# Patient Record
Sex: FEMALE | Race: BLACK OR AFRICAN AMERICAN | NOT HISPANIC OR LATINO | Employment: PART TIME
[De-identification: names, ages, dates, MRNs, and addresses within clinical notes are randomized per-mention and may not be internally consistent; named-entity substitution may affect disease eponyms.]

---

## 2018-04-17 ENCOUNTER — RECORDS - HEALTHEAST (OUTPATIENT)
Dept: ADMINISTRATIVE | Facility: OTHER | Age: 33
End: 2018-04-17

## 2018-04-17 ENCOUNTER — RECORDS - HEALTHEAST (OUTPATIENT)
Dept: LAB | Facility: CLINIC | Age: 33
End: 2018-04-17

## 2018-04-17 LAB
ALBUMIN SERPL-MCNC: 3.3 G/DL (ref 3.5–5)
ALP SERPL-CCNC: 64 U/L (ref 45–120)
ALT SERPL W P-5'-P-CCNC: 22 U/L (ref 0–45)
ANION GAP SERPL CALCULATED.3IONS-SCNC: 10 MMOL/L (ref 5–18)
AST SERPL W P-5'-P-CCNC: 24 U/L (ref 0–40)
BILIRUB SERPL-MCNC: 0.3 MG/DL (ref 0–1)
BUN SERPL-MCNC: 11 MG/DL (ref 8–22)
CALCIUM SERPL-MCNC: 9.4 MG/DL (ref 8.5–10.5)
CHLORIDE BLD-SCNC: 108 MMOL/L (ref 98–107)
CHOLEST SERPL-MCNC: 165 MG/DL
CO2 SERPL-SCNC: 21 MMOL/L (ref 22–31)
CREAT SERPL-MCNC: 0.79 MG/DL (ref 0.6–1.1)
FASTING STATUS PATIENT QL REPORTED: NORMAL
GFR SERPL CREATININE-BSD FRML MDRD: >60 ML/MIN/1.73M2
GLUCOSE BLD-MCNC: 120 MG/DL (ref 70–125)
HDLC SERPL-MCNC: 53 MG/DL
LDLC SERPL CALC-MCNC: 91 MG/DL
MAGNESIUM SERPL-MCNC: 1.7 MG/DL (ref 1.8–2.6)
POTASSIUM BLD-SCNC: 3.9 MMOL/L (ref 3.5–5)
PROT SERPL-MCNC: 5.9 G/DL (ref 6–8)
SODIUM SERPL-SCNC: 139 MMOL/L (ref 136–145)
TRIGL SERPL-MCNC: 105 MG/DL

## 2018-04-18 LAB
HPV SOURCE: NORMAL
HUMAN PAPILLOMA VIRUS 16 DNA: NEGATIVE
HUMAN PAPILLOMA VIRUS 18 DNA: NEGATIVE
HUMAN PAPILLOMA VIRUS FINAL DIAGNOSIS: NORMAL
HUMAN PAPILLOMA VIRUS OTHER HR: NEGATIVE
SPECIMEN DESCRIPTION: NORMAL

## 2018-04-24 ENCOUNTER — HOSPITAL ENCOUNTER (OUTPATIENT)
Dept: MAMMOGRAPHY | Facility: CLINIC | Age: 33
Discharge: HOME | End: 2018-04-24

## 2018-04-24 DIAGNOSIS — R59.0 ENLARGED LYMPH NODES IN ARMPIT: ICD-10-CM

## 2018-06-15 ENCOUNTER — RECORDS - HEALTHEAST (OUTPATIENT)
Dept: LAB | Facility: CLINIC | Age: 33
End: 2018-06-15

## 2018-06-15 LAB
T4 FREE SERPL-MCNC: 1 NG/DL (ref 0.7–1.8)
TSH SERPL DL<=0.005 MIU/L-ACNC: 0.76 UIU/ML (ref 0.3–5)

## 2018-10-17 ENCOUNTER — RECORDS - HEALTHEAST (OUTPATIENT)
Dept: LAB | Facility: CLINIC | Age: 33
End: 2018-10-17

## 2018-10-18 LAB — HCG SERPL-ACNC: ABNORMAL MLU/ML (ref 0–4)

## 2018-10-30 ENCOUNTER — COMMUNICATION - HEALTHEAST (OUTPATIENT)
Dept: SCHEDULING | Facility: CLINIC | Age: 33
End: 2018-10-30

## 2018-11-14 ENCOUNTER — AMBULATORY - HEALTHEAST (OUTPATIENT)
Dept: MATERNAL FETAL MEDICINE | Facility: HOSPITAL | Age: 33
End: 2018-11-14

## 2018-11-14 ENCOUNTER — TELEPHONE (OUTPATIENT)
Dept: MATERNAL FETAL MEDICINE | Facility: CLINIC | Age: 33
End: 2018-11-14

## 2018-11-14 ENCOUNTER — CARE COORDINATION (OUTPATIENT)
Dept: MATERNAL FETAL MEDICINE | Facility: CLINIC | Age: 33
End: 2018-11-14

## 2018-11-14 DIAGNOSIS — Z34.90 PRENATAL CARE, ANTEPARTUM: ICD-10-CM

## 2018-11-15 ENCOUNTER — TRANSFERRED RECORDS (OUTPATIENT)
Dept: HEALTH INFORMATION MANAGEMENT | Facility: CLINIC | Age: 33
End: 2018-11-15

## 2018-11-15 ENCOUNTER — MEDICAL CORRESPONDENCE (OUTPATIENT)
Dept: HEALTH INFORMATION MANAGEMENT | Facility: CLINIC | Age: 33
End: 2018-11-15

## 2018-11-15 ENCOUNTER — AMBULATORY - HEALTHEAST (OUTPATIENT)
Dept: MATERNAL FETAL MEDICINE | Facility: HOSPITAL | Age: 33
End: 2018-11-15

## 2018-11-15 DIAGNOSIS — Z34.90 PRENATAL CARE, ANTEPARTUM: ICD-10-CM

## 2018-11-15 DIAGNOSIS — Z98.890 HISTORY OF CERCLAGE, CURRENTLY PREGNANT: ICD-10-CM

## 2018-11-15 DIAGNOSIS — O09.299 HISTORY OF CERCLAGE, CURRENTLY PREGNANT: ICD-10-CM

## 2018-11-15 DIAGNOSIS — O09.899 HISTORY OF PRETERM DELIVERY, CURRENTLY PREGNANT: Primary | ICD-10-CM

## 2018-11-16 ENCOUNTER — AMBULATORY - HEALTHEAST (OUTPATIENT)
Dept: MATERNAL FETAL MEDICINE | Facility: HOSPITAL | Age: 33
End: 2018-11-16

## 2018-11-28 ENCOUNTER — OFFICE VISIT - HEALTHEAST (OUTPATIENT)
Dept: MATERNAL FETAL MEDICINE | Facility: HOSPITAL | Age: 33
End: 2018-11-28

## 2018-11-28 ENCOUNTER — RECORDS - HEALTHEAST (OUTPATIENT)
Dept: ULTRASOUND IMAGING | Facility: HOSPITAL | Age: 33
End: 2018-11-28

## 2018-11-28 ENCOUNTER — RECORDS - HEALTHEAST (OUTPATIENT)
Dept: ADMINISTRATIVE | Facility: OTHER | Age: 33
End: 2018-11-28

## 2018-11-28 DIAGNOSIS — Z34.90 ENCOUNTER FOR SUPERVISION OF NORMAL PREGNANCY, UNSPECIFIED, UNSPECIFIED TRIMESTER: ICD-10-CM

## 2018-11-28 DIAGNOSIS — O09.892 HISTORY OF PRETERM DELIVERY, CURRENTLY PREGNANT IN SECOND TRIMESTER: ICD-10-CM

## 2018-11-28 DIAGNOSIS — Z34.90 PRENATAL CARE, ANTEPARTUM: ICD-10-CM

## 2018-11-28 DIAGNOSIS — O09.522 AMA (ADVANCED MATERNAL AGE) MULTIGRAVIDA 35+, SECOND TRIMESTER: ICD-10-CM

## 2018-11-28 DIAGNOSIS — O99.210 OBESITY AFFECTING PREGNANCY, ANTEPARTUM: ICD-10-CM

## 2018-12-23 NOTE — TELEPHONE ENCOUNTER
Called and spoke with Lory regarding the referral sent by her primary OB provider for an MFM consultation.  Asked Lory about her previous pregnancy experiences and outcomes and where she received care during these labors.  Lory informed writer she had two previous  labors and deliveries, one of which occurred after cerclage placement. Lory informed this writer care was received at [facility].  Release of information sent to email provided.  Will await signed ANDREE and then schedule patient as soon as possible.    Shira Little RN   no

## 2018-12-26 ENCOUNTER — RECORDS - HEALTHEAST (OUTPATIENT)
Dept: ADMINISTRATIVE | Facility: OTHER | Age: 33
End: 2018-12-26

## 2019-01-14 ENCOUNTER — AMBULATORY - HEALTHEAST (OUTPATIENT)
Dept: MATERNAL FETAL MEDICINE | Facility: HOSPITAL | Age: 34
End: 2019-01-14

## 2019-02-25 ENCOUNTER — AMBULATORY - HEALTHEAST (OUTPATIENT)
Dept: MATERNAL FETAL MEDICINE | Facility: HOSPITAL | Age: 34
End: 2019-02-25

## 2019-02-25 DIAGNOSIS — O26.90 PREGNANCY, ANTEPARTUM, COMPLICATIONS: ICD-10-CM

## 2019-02-26 ENCOUNTER — AMBULATORY - HEALTHEAST (OUTPATIENT)
Dept: MATERNAL FETAL MEDICINE | Facility: HOSPITAL | Age: 34
End: 2019-02-26

## 2019-02-26 DIAGNOSIS — O26.90 PREGNANCY, ANTEPARTUM, COMPLICATIONS: ICD-10-CM

## 2019-02-26 DIAGNOSIS — O35.9XX0 SUSPECTED FETAL ANOMALY, ANTEPARTUM, SINGLE OR UNSPECIFIED FETUS: Primary | ICD-10-CM

## 2019-02-27 ENCOUNTER — AMBULATORY - HEALTHEAST (OUTPATIENT)
Dept: MATERNAL FETAL MEDICINE | Facility: HOSPITAL | Age: 34
End: 2019-02-27

## 2019-02-27 DIAGNOSIS — O26.90 PREGNANCY, ANTEPARTUM, COMPLICATIONS: ICD-10-CM

## 2019-02-27 DIAGNOSIS — Z33.1 PREGNANCY, INCIDENTAL: Primary | ICD-10-CM

## 2019-02-28 ENCOUNTER — TELEPHONE (OUTPATIENT)
Dept: MATERNAL FETAL MEDICINE | Facility: CLINIC | Age: 34
End: 2019-02-28

## 2019-02-28 NOTE — TELEPHONE ENCOUNTER
CARMEN received referral from [facility].  Patient is scheduled for L2 and RAD consult on 3/8/19 at [facility].    Rose Marie Crain

## 2019-03-08 ENCOUNTER — RECORDS - HEALTHEAST (OUTPATIENT)
Dept: ADMINISTRATIVE | Facility: OTHER | Age: 34
End: 2019-03-08

## 2019-03-08 ENCOUNTER — RECORDS - HEALTHEAST (OUTPATIENT)
Dept: ULTRASOUND IMAGING | Facility: HOSPITAL | Age: 34
End: 2019-03-08

## 2019-03-08 ENCOUNTER — OFFICE VISIT - HEALTHEAST (OUTPATIENT)
Dept: MATERNAL FETAL MEDICINE | Facility: HOSPITAL | Age: 34
End: 2019-03-08

## 2019-03-08 DIAGNOSIS — O26.90 PREGNANCY RELATED CONDITIONS, UNSPECIFIED, UNSPECIFIED TRIMESTER: ICD-10-CM

## 2019-03-08 DIAGNOSIS — O35.9XX0 SUSPECTED FETAL ANOMALY, ANTEPARTUM, SINGLE OR UNSPECIFIED FETUS: ICD-10-CM

## 2019-03-26 ENCOUNTER — RECORDS - HEALTHEAST (OUTPATIENT)
Dept: ADMINISTRATIVE | Facility: OTHER | Age: 34
End: 2019-03-26

## 2019-04-08 ENCOUNTER — AMBULATORY - HEALTHEAST (OUTPATIENT)
Dept: MATERNAL FETAL MEDICINE | Facility: HOSPITAL | Age: 34
End: 2019-04-08

## 2019-04-08 DIAGNOSIS — O26.90 PREGNANCY, ANTEPARTUM, COMPLICATIONS: ICD-10-CM

## 2019-04-10 ENCOUNTER — RECORDS - HEALTHEAST (OUTPATIENT)
Dept: ULTRASOUND IMAGING | Facility: HOSPITAL | Age: 34
End: 2019-04-10

## 2019-04-10 ENCOUNTER — RECORDS - HEALTHEAST (OUTPATIENT)
Dept: ADMINISTRATIVE | Facility: OTHER | Age: 34
End: 2019-04-10

## 2019-04-10 ENCOUNTER — OFFICE VISIT - HEALTHEAST (OUTPATIENT)
Dept: MATERNAL FETAL MEDICINE | Facility: HOSPITAL | Age: 34
End: 2019-04-10

## 2019-04-10 DIAGNOSIS — O26.90 PREGNANCY RELATED CONDITIONS, UNSPECIFIED, UNSPECIFIED TRIMESTER: ICD-10-CM

## 2019-04-10 DIAGNOSIS — Z03.74 SUSPECTED PROBLEM WITH FETAL GROWTH NOT FOUND: ICD-10-CM

## 2019-05-06 ENCOUNTER — RECORDS - HEALTHEAST (OUTPATIENT)
Dept: ADMINISTRATIVE | Facility: OTHER | Age: 34
End: 2019-05-06

## 2019-05-06 ASSESSMENT — MIFFLIN-ST. JEOR: SCORE: 2037.13

## 2019-05-07 ENCOUNTER — ANESTHESIA - HEALTHEAST (OUTPATIENT)
Dept: OBGYN | Facility: HOSPITAL | Age: 34
End: 2019-05-07

## 2019-05-08 ENCOUNTER — SURGERY - HEALTHEAST (OUTPATIENT)
Dept: OBGYN | Facility: HOSPITAL | Age: 34
End: 2019-05-08

## 2019-05-15 ENCOUNTER — COMMUNICATION - HEALTHEAST (OUTPATIENT)
Dept: OBGYN | Facility: HOSPITAL | Age: 34
End: 2019-05-15

## 2019-05-20 ENCOUNTER — COMMUNICATION - HEALTHEAST (OUTPATIENT)
Dept: OBGYN | Facility: HOSPITAL | Age: 34
End: 2019-05-20

## 2019-10-16 ENCOUNTER — RECORDS - HEALTHEAST (OUTPATIENT)
Dept: LAB | Facility: CLINIC | Age: 34
End: 2019-10-16

## 2019-10-16 LAB
ALBUMIN SERPL-MCNC: 3.4 G/DL (ref 3.5–5)
ALP SERPL-CCNC: 50 U/L (ref 45–120)
ALT SERPL W P-5'-P-CCNC: 21 U/L (ref 0–45)
ANION GAP SERPL CALCULATED.3IONS-SCNC: 8 MMOL/L (ref 5–18)
AST SERPL W P-5'-P-CCNC: 22 U/L (ref 0–40)
BILIRUB SERPL-MCNC: 0.2 MG/DL (ref 0–1)
BUN SERPL-MCNC: 12 MG/DL (ref 8–22)
CALCIUM SERPL-MCNC: 9.4 MG/DL (ref 8.5–10.5)
CHLORIDE BLD-SCNC: 108 MMOL/L (ref 98–107)
CHOLEST SERPL-MCNC: 159 MG/DL
CO2 SERPL-SCNC: 23 MMOL/L (ref 22–31)
CREAT SERPL-MCNC: 0.87 MG/DL (ref 0.6–1.1)
FASTING STATUS PATIENT QL REPORTED: NORMAL
GFR SERPL CREATININE-BSD FRML MDRD: >60 ML/MIN/1.73M2
GLUCOSE BLD-MCNC: 109 MG/DL (ref 70–125)
HDLC SERPL-MCNC: 52 MG/DL
LDLC SERPL CALC-MCNC: 86 MG/DL
MAGNESIUM SERPL-MCNC: 1.8 MG/DL (ref 1.8–2.6)
POTASSIUM BLD-SCNC: 4.6 MMOL/L (ref 3.5–5)
PROT SERPL-MCNC: 5.9 G/DL (ref 6–8)
SODIUM SERPL-SCNC: 139 MMOL/L (ref 136–145)
TRIGL SERPL-MCNC: 107 MG/DL

## 2020-06-24 ENCOUNTER — RECORDS - HEALTHEAST (OUTPATIENT)
Dept: LAB | Facility: CLINIC | Age: 35
End: 2020-06-24

## 2020-06-24 LAB
C DIFF TOX B STL QL: NEGATIVE
RIBOTYPE 027/NAP1/BI: NORMAL

## 2020-06-25 LAB
G LAMBLIA AG STL QL IA: NORMAL
O+P STL MICRO: NORMAL
SHIGA TOXIN 1: NEGATIVE
SHIGA TOXIN 2: NEGATIVE

## 2020-06-27 LAB — BACTERIA SPEC CULT: NORMAL

## 2020-07-21 ENCOUNTER — RECORDS - HEALTHEAST (OUTPATIENT)
Dept: LAB | Facility: CLINIC | Age: 35
End: 2020-07-21

## 2020-07-21 LAB
ALBUMIN SERPL-MCNC: 3.1 G/DL (ref 3.5–5)
ALP SERPL-CCNC: 59 U/L (ref 45–120)
ALT SERPL W P-5'-P-CCNC: 21 U/L (ref 0–45)
ANION GAP SERPL CALCULATED.3IONS-SCNC: 6 MMOL/L (ref 5–18)
AST SERPL W P-5'-P-CCNC: 18 U/L (ref 0–40)
BILIRUB SERPL-MCNC: <0.1 MG/DL (ref 0–1)
BUN SERPL-MCNC: 9 MG/DL (ref 8–22)
CALCIUM SERPL-MCNC: 9.1 MG/DL (ref 8.5–10.5)
CHLORIDE BLD-SCNC: 108 MMOL/L (ref 98–107)
CO2 SERPL-SCNC: 23 MMOL/L (ref 22–31)
CREAT SERPL-MCNC: 0.8 MG/DL (ref 0.6–1.1)
GFR SERPL CREATININE-BSD FRML MDRD: >60 ML/MIN/1.73M2
GLUCOSE BLD-MCNC: 99 MG/DL (ref 70–125)
IRON SATN MFR SERPL: 3 % (ref 20–50)
IRON SERPL-MCNC: 12 UG/DL (ref 42–175)
MAGNESIUM SERPL-MCNC: 2 MG/DL (ref 1.8–2.6)
POTASSIUM BLD-SCNC: 4.5 MMOL/L (ref 3.5–5)
PROT SERPL-MCNC: 6 G/DL (ref 6–8)
SODIUM SERPL-SCNC: 137 MMOL/L (ref 136–145)
TIBC SERPL-MCNC: 414 UG/DL (ref 313–563)
TRANSFERRIN SERPL-MCNC: 331 MG/DL (ref 212–360)
TSH SERPL DL<=0.005 MIU/L-ACNC: 0.66 UIU/ML (ref 0.3–5)
VIT B12 SERPL-MCNC: 370 PG/ML (ref 213–816)

## 2020-07-23 ENCOUNTER — RECORDS - HEALTHEAST (OUTPATIENT)
Dept: LAB | Facility: CLINIC | Age: 35
End: 2020-07-23

## 2020-07-28 LAB
GLIADIN IGA SER-ACNC: 0.3 U/ML
GLIADIN IGG SER-ACNC: <0.4 U/ML
IGA SERPL-MCNC: 79 MG/DL (ref 65–400)
TTG IGA SER-ACNC: 0.2 U/ML
TTG IGG SER-ACNC: <0.6 U/ML

## 2020-08-11 ENCOUNTER — RECORDS - HEALTHEAST (OUTPATIENT)
Dept: LAB | Facility: CLINIC | Age: 35
End: 2020-08-11

## 2020-08-11 ENCOUNTER — TRANSFERRED RECORDS (OUTPATIENT)
Dept: HEALTH INFORMATION MANAGEMENT | Facility: CLINIC | Age: 35
End: 2020-08-11

## 2020-08-11 LAB
ERYTHROCYTE [DISTWIDTH] IN BLOOD BY AUTOMATED COUNT: 17.4 % (ref 11–14.5)
HCT VFR BLD AUTO: 32.7 % (ref 35–47)
HGB BLD-MCNC: 9.6 G/DL (ref 12–16)
MCH RBC QN AUTO: 20.1 PG (ref 27–34)
MCHC RBC AUTO-ENTMCNC: 29.4 G/DL (ref 32–36)
MCV RBC AUTO: 69 FL (ref 80–100)
PLATELET # BLD AUTO: 509 THOU/UL (ref 140–440)
PMV BLD AUTO: 10.8 FL (ref 8.5–12.5)
RBC # BLD AUTO: 4.77 MILL/UL (ref 3.8–5.4)
WBC: 6.1 THOU/UL (ref 4–11)

## 2020-08-25 ENCOUNTER — OFFICE VISIT (OUTPATIENT)
Dept: NEUROLOGY | Facility: CLINIC | Age: 35
End: 2020-08-25
Payer: COMMERCIAL

## 2020-08-25 ENCOUNTER — RECORDS - HEALTHEAST (OUTPATIENT)
Dept: ADMINISTRATIVE | Facility: OTHER | Age: 35
End: 2020-08-25

## 2020-08-25 VITALS
SYSTOLIC BLOOD PRESSURE: 124 MMHG | DIASTOLIC BLOOD PRESSURE: 82 MMHG | BODY MASS INDEX: 42.03 KG/M2 | HEART RATE: 94 BPM | WEIGHT: 267.8 LBS | HEIGHT: 67 IN

## 2020-08-25 DIAGNOSIS — R20.2 PARESTHESIAS: Primary | ICD-10-CM

## 2020-08-25 DIAGNOSIS — E66.01 MORBID OBESITY (H): ICD-10-CM

## 2020-08-25 PROBLEM — I10 BENIGN ESSENTIAL HYPERTENSION: Status: ACTIVE | Noted: 2020-08-25

## 2020-08-25 PROBLEM — O09.40 GESTATIONAL HYPERTENSION AFFECTING EIGHTH PREGNANCY: Status: ACTIVE | Noted: 2019-05-07

## 2020-08-25 PROBLEM — K50.90 CROHN'S DISEASE (H): Status: ACTIVE | Noted: 2020-08-25

## 2020-08-25 PROBLEM — O13.9 GESTATIONAL HYPERTENSION AFFECTING EIGHTH PREGNANCY: Status: ACTIVE | Noted: 2019-05-07

## 2020-08-25 PROCEDURE — 99205 OFFICE O/P NEW HI 60 MIN: CPT | Performed by: PSYCHIATRY & NEUROLOGY

## 2020-08-25 RX ORDER — NIFEDIPINE 30 MG/1
30 TABLET, EXTENDED RELEASE ORAL DAILY
COMMUNITY
Start: 2019-05-14

## 2020-08-25 RX ORDER — IBUPROFEN 400 MG/1
400 TABLET, FILM COATED ORAL PRN
COMMUNITY
Start: 2020-08-04

## 2020-08-25 RX ORDER — DESOGESTREL AND ETHINYL ESTRADIOL AND ETHINYL ESTRADIOL 21-5 (28)
KIT ORAL
COMMUNITY
Start: 2020-07-13

## 2020-08-25 RX ORDER — BUDESONIDE 3 MG/1
3 CAPSULE, COATED PELLETS ORAL DAILY
COMMUNITY
Start: 2020-08-03

## 2020-08-25 RX ORDER — ACETAMINOPHEN 500 MG
1000 TABLET ORAL
COMMUNITY

## 2020-08-25 RX ORDER — FERROUS FUMARATE 324(106)MG
324 TABLET ORAL DAILY
COMMUNITY
Start: 2020-08-12

## 2020-08-25 ASSESSMENT — MIFFLIN-ST. JEOR: SCORE: 1932.36

## 2020-08-25 NOTE — LETTER
2020         RE: Lory Rodriguez  [address]  [place] [address]        Dear Colleague,    Thank you for referring your patient, Lory Rodriguez, to the [facility] NEUROLOGY [place]. Please see a copy of my visit note below.    NEUROLOGY CONSULTATION NOTE       [facility] NEUROLOGY [place]  [address] [place] [address]  Tel: [phone]  Fax: [phone]  [email or web address]     Lory Rodriguez,  1985, MRN [id number]  PCP: Zahra Valdez, [phone]  Date: 2020     ASSESSMENT & PLAN     Diagnosis code: Paresthesias     Right arm and leg cramps associated with left leg burning/tingling  35-year-old female with hypertension and possible Crohn's disease who has somewhat peculiar set of symptoms.  She reports that change in position brings on right arm and leg cramping along with left leg burning.  These symptoms are severe enough that she avoids sitting at work.  She had MRI of the lumbar spine that was unremarkable.  I have told her that we will start of looking at her spine and check MRI of cervical and thoracic spine in addition to EMG of the lower extremity.  In addition, I am checking B12, vitamin E, folic acid and RPR.  If these tests are negative, next step will be to check MRI of the head and EEG.  I do not get the sense that these are psychosomatic symptoms and hopefully above work-up will help clarify clinical picture.    Thank you again for this referral, please feel free to contact me if you have any questions.    Andrew Hernandez MD  [facility]  (Formerly, [facility]     REASON FOR CONSULTATION Neurologic Problem        HISTORY OF PRESENT ILLNESS     We have been requested by Dr. valdez to evaluate Lory Rodriguez who is a 35 year old  female for paresthesias and muscle spasm    Patient is morbidly obese 35 years history of hypertension, Crohn's disease who  was referred for evaluation of right leg and cramping associated with left leg burning that started 2 months ago.  She has somewhat peculiar description of her symptoms and reports that if she has been sitting for long period of time or laying down and as she tries to get up she notices that her right arm and leg would cramp up and at that time her left leg starts burning.  She had MRI of the lumbar spine that was unremarkable.  The symptoms are severe enough that she avoids sitting at work.  She denies any neck or back pain.  There is no history of any bowel or bladder incontinence or any tonic-clonic activity.  There is no history of any nutritional deficiencies or any trauma.  She also carries a diagnosis of Crohn's disease and currently is getting some work-up done.  She denies any skin color changes, edema, fasciculations, dysphagia, dysarthria or diplopia     PROBLEM LIST   Patient Active Problem List   Diagnosis Code     Gestational hypertension affecting eighth pregnancy O13.9, O09.40     Crohn's disease (H) K50.90     Morbid obesity (H) E66.01     Benign essential hypertension I10         PAST MEDICAL & SURGICAL HISTORY     Past Medical History:   Patient  has a past medical history of Hypertension.    Surgical History:  She  has no past surgical history on file.     SOCIAL HISTORY     Reviewed, and she  reports that she has been smoking cigarettes. She has a 2.50 pack-year smoking history. She has never used smokeless tobacco. She reports previous alcohol use. She reports previous drug use.     FAMILY HISTORY     Reviewed, and family history includes Migraines in her sister; Seizure Disorder in her brother.     ALLERGIES     No Known Allergies      REVIEW OF SYSTEMS     A 12 point review of system was performed and was negative except as outlined in the history of present illness.     HOME MEDICATIONS       Current Outpatient Medications:      acetaminophen (TYLENOL) 500 MG tablet, Take 1,000 mg by mouth,  "Disp: , Rfl:      budesonide (ENTOCORT EC) 3 MG EC capsule, Take 3 mg by mouth daily, Disp: , Rfl:      FERROUS FUMARATE 324 MG TABS tablet, Take 324 mg by mouth daily, Disp: , Rfl:      ibuprofen (ADVIL/MOTRIN) 400 MG tablet, Take 400 mg by mouth as needed, Disp: , Rfl:      NIFEdipine ER OSMOTIC (PROCARDIA XL) 30 MG 24 hr tablet, Take 30 mg by mouth daily, Disp: , Rfl:      VIORELE 0.15-0.02/0.01 MG (21/5) tablet, TK 1 T PO QD, Disp: , Rfl:       PHYSICAL EXAM     Vital signs  /82 (BP Location: Right arm, Patient Position: Sitting, Cuff Size: Adult Regular)   Pulse 94   Ht 1.702 m (5' 7\")   Wt 121.5 kg (267 lb 12.8 oz)   BMI 41.94 kg/m      Weight:   267 lbs 12.8 oz    GENERAL PHYSICAL EXAM: Patient is alert and oriented x 4 in no acute distress. Neck was supple, no carotid bruits, thyromegaly, lymphadenopathy or JVD noted.   NEUROLOGICAL EXAM:  Patient is alert and oriented x3 no acute distress.  Speech normal with no dysarthria or aphasia.  Cranial nerves II through XII are intact.  Motor strength 5/5.  Reflexes 2+ in the upper extremity absent in the lower extremity.  Sensation decreased to pinprick below the knee on the right side normal on the left.  Vibratory sensation normal.  Gait normal she is able to walk on her toes and heels without any difficulty     DIAGNOSTIC STUDIES     PERTINENT RADIOLOGY  Following imaging studies were reviewed:     MRI LUMBAR SPINE 7/30/2020  1. Mild facet arthropathy at L4-L5 and L5-S1.  2. Otherwise unremarkable lumbar spine MRI.     PERTINENT LABS  Following labs were reviewed:  Component Name Value Ref Range   Sodium 137 136 - 145 mmol/L   Potassium 4.5 3.5 - 5 mmol/L   Chloride 108 (H) 98 - 107 mmol/L   CO2 23 22 - 31 mmol/L   Anion Gap, Calculation 6 5 - 18 mmol/L   Glucose 99 70 - 125 mg/dL   BUN 9 8 - 22 mg/dL   Creatinine 0.80 0.6 - 1.1 mg/dL   GFR MDRD Af Amer >60 >60 mL/min/1.73m2   GFR MDRD Non Af Amer >60 >60 mL/min/1.73m2   Bilirubin, Total <0.1 0 - 1 " mg/dL   Calcium 9.1 8.5 - 10.5 mg/dL   Protein, Total 6.0 6 - 8 g/dL   Albumin 3.1 (L) 3.5 - 5 g/dL   Alkaline Phosphatase 59 45 - 120 U/L   AST 18 0 - 40 U/L   ALT 21 0 - 45 U/L   Specimen Collected on   Blood 7/21/2020 10:53 AM                 Total time spent for face to face visit, reviewing labs/imaging studies, counseling and coordination of care was: 1 Hour 15 Minutes More than 50% of this time was spent on counseling and coordination of care.      This note was dictated using voice recognition software.  Any grammatical or context distortions are unintentional and inherent to the software.       Again, thank you for allowing me to participate in the care of your patient.        Sincerely,        Andrew Hernandez MD

## 2020-08-25 NOTE — NURSING NOTE
"Lory Rodriguez is a 35 year old female who presents for:  No chief complaint on file.       Initial Vitals:  /82 (BP Location: Right arm, Patient Position: Sitting, Cuff Size: Adult Regular)   Pulse 94   Ht 1.702 m (5' 7\")   Wt 121.5 kg (267 lb 12.8 oz)   BMI 41.94 kg/m   Estimated body mass index is 41.94 kg/m  as calculated from the following:    Height as of this encounter: 1.702 m (5' 7\").    Weight as of this encounter: 121.5 kg (267 lb 12.8 oz).. Body surface area is 2.4 meters squared. BP completed using cuff size: wrist  Data Unavailable    Nursing Comments: see chief complaint    Frandy Santos, [facility]      "

## 2020-08-25 NOTE — PROGRESS NOTES
NEUROLOGY CONSULTATION NOTE       [facility] NEUROLOGY [place]  [address] [place] [address]  Tel: [phone]  Fax: [phone]  [email or web address]     Lory Rodriguez,  1985, MRN [id number]  PCP: Zahra Valdez, [phone]  Date: 2020     ASSESSMENT & PLAN     Diagnosis code: Paresthesias     Right arm and leg cramps associated with left leg burning/tingling  35-year-old female with hypertension and possible Crohn's disease who has somewhat peculiar set of symptoms.  She reports that change in position brings on right arm and leg cramping along with left leg burning.  These symptoms are severe enough that she avoids sitting at work.  She had MRI of the lumbar spine that was unremarkable.  I have told her that we will start of looking at her spine and check MRI of cervical and thoracic spine in addition to EMG of the lower extremity.  In addition, I am checking B12, vitamin E, folic acid and RPR.  If these tests are negative, next step will be to check MRI of the head and EEG.  I do not get the sense that these are psychosomatic symptoms and hopefully above work-up will help clarify clinical picture.    Thank you again for this referral, please feel free to contact me if you have any questions.    Andrew Hernandez MD  [facility]  (Formerly, [facility]     REASON FOR CONSULTATION Neurologic Problem        HISTORY OF PRESENT ILLNESS     We have been requested by Dr. valdez to evaluate Lory Rodriguez who is a 35 year old  female for paresthesias and muscle spasm    Patient is morbidly obese 35 years history of hypertension, Crohn's disease who was referred for evaluation of right leg and cramping associated with left leg burning that started 2 months ago.  She has somewhat peculiar description of her symptoms and reports that if she has been sitting for long period of time or laying down and as she tries to  get up she notices that her right arm and leg would cramp up and at that time her left leg starts burning.  She had MRI of the lumbar spine that was unremarkable.  The symptoms are severe enough that she avoids sitting at work.  She denies any neck or back pain.  There is no history of any bowel or bladder incontinence or any tonic-clonic activity.  There is no history of any nutritional deficiencies or any trauma.  She also carries a diagnosis of Crohn's disease and currently is getting some work-up done.  She denies any skin color changes, edema, fasciculations, dysphagia, dysarthria or diplopia     PROBLEM LIST   Patient Active Problem List   Diagnosis Code     Gestational hypertension affecting eighth pregnancy O13.9, O09.40     Crohn's disease (H) K50.90     Morbid obesity (H) E66.01     Benign essential hypertension I10         PAST MEDICAL & SURGICAL HISTORY     Past Medical History:   Patient  has a past medical history of Hypertension.    Surgical History:  She  has no past surgical history on file.     SOCIAL HISTORY     Reviewed, and she  reports that she has been smoking cigarettes. She has a 2.50 pack-year smoking history. She has never used smokeless tobacco. She reports previous alcohol use. She reports previous drug use.     FAMILY HISTORY     Reviewed, and family history includes Migraines in her sister; Seizure Disorder in her brother.     ALLERGIES     No Known Allergies      REVIEW OF SYSTEMS     A 12 point review of system was performed and was negative except as outlined in the history of present illness.     HOME MEDICATIONS       Current Outpatient Medications:      acetaminophen (TYLENOL) 500 MG tablet, Take 1,000 mg by mouth, Disp: , Rfl:      budesonide (ENTOCORT EC) 3 MG EC capsule, Take 3 mg by mouth daily, Disp: , Rfl:      FERROUS FUMARATE 324 MG TABS tablet, Take 324 mg by mouth daily, Disp: , Rfl:      ibuprofen (ADVIL/MOTRIN) 400 MG tablet, Take 400 mg by mouth as needed, Disp: ,  "Rfl:      NIFEdipine ER OSMOTIC (PROCARDIA XL) 30 MG 24 hr tablet, Take 30 mg by mouth daily, Disp: , Rfl:      VIORELE 0.15-0.02/0.01 MG (21/5) tablet, TK 1 T PO QD, Disp: , Rfl:       PHYSICAL EXAM     Vital signs  /82 (BP Location: Right arm, Patient Position: Sitting, Cuff Size: Adult Regular)   Pulse 94   Ht 1.702 m (5' 7\")   Wt 121.5 kg (267 lb 12.8 oz)   BMI 41.94 kg/m      Weight:   267 lbs 12.8 oz    GENERAL PHYSICAL EXAM: Patient is alert and oriented x 4 in no acute distress. Neck was supple, no carotid bruits, thyromegaly, lymphadenopathy or JVD noted.   NEUROLOGICAL EXAM:  Patient is alert and oriented x3 no acute distress.  Speech normal with no dysarthria or aphasia.  Cranial nerves II through XII are intact.  Motor strength 5/5.  Reflexes 2+ in the upper extremity absent in the lower extremity.  Sensation decreased to pinprick below the knee on the right side normal on the left.  Vibratory sensation normal.  Gait normal she is able to walk on her toes and heels without any difficulty     DIAGNOSTIC STUDIES     PERTINENT RADIOLOGY  Following imaging studies were reviewed:     MRI LUMBAR SPINE 7/30/2020  1. Mild facet arthropathy at L4-L5 and L5-S1.  2. Otherwise unremarkable lumbar spine MRI.     PERTINENT LABS  Following labs were reviewed:  Component Name Value Ref Range   Sodium 137 136 - 145 mmol/L   Potassium 4.5 3.5 - 5 mmol/L   Chloride 108 (H) 98 - 107 mmol/L   CO2 23 22 - 31 mmol/L   Anion Gap, Calculation 6 5 - 18 mmol/L   Glucose 99 70 - 125 mg/dL   BUN 9 8 - 22 mg/dL   Creatinine 0.80 0.6 - 1.1 mg/dL   GFR MDRD Af Amer >60 >60 mL/min/1.73m2   GFR MDRD Non Af Amer >60 >60 mL/min/1.73m2   Bilirubin, Total <0.1 0 - 1 mg/dL   Calcium 9.1 8.5 - 10.5 mg/dL   Protein, Total 6.0 6 - 8 g/dL   Albumin 3.1 (L) 3.5 - 5 g/dL   Alkaline Phosphatase 59 45 - 120 U/L   AST 18 0 - 40 U/L   ALT 21 0 - 45 U/L   Specimen Collected on   Blood 7/21/2020 10:53 AM                 Total time spent for " face to face visit, reviewing labs/imaging studies, counseling and coordination of care was: 1 Hour 15 Minutes More than 50% of this time was spent on counseling and coordination of care.      This note was dictated using voice recognition software.  Any grammatical or context distortions are unintentional and inherent to the software.

## 2020-09-08 ENCOUNTER — RECORDS - HEALTHEAST (OUTPATIENT)
Dept: LAB | Facility: HOSPITAL | Age: 35
End: 2020-09-08

## 2020-09-08 LAB
FOLATE SERPL-MCNC: 10.2 NG/ML
T PALLIDUM AB SER QL: NEGATIVE
VIT B12 SERPL-MCNC: 338 PG/ML (ref 213–816)

## 2020-09-10 LAB
A-TOCOPHEROL VIT E SERPL-MCNC: 7.6 MG/L (ref 5.5–18)
BETA+GAMMA TOCOPHEROL SERPL-MCNC: 1 MG/L (ref 0–6)
VIT B1 PYROPHOSHATE BLD-SCNC: 82 NMOL/L (ref 70–180)

## 2020-09-21 ENCOUNTER — OFFICE VISIT (OUTPATIENT)
Dept: NEUROLOGY | Facility: CLINIC | Age: 35
End: 2020-09-21
Attending: PSYCHIATRY & NEUROLOGY
Payer: COMMERCIAL

## 2020-09-21 DIAGNOSIS — R20.2 PARESTHESIAS: ICD-10-CM

## 2020-09-21 PROCEDURE — 95886 MUSC TEST DONE W/N TEST COMP: CPT | Mod: RT | Performed by: PSYCHIATRY & NEUROLOGY

## 2020-09-21 PROCEDURE — 95913 NRV CNDJ TEST 13/> STUDIES: CPT | Performed by: PSYCHIATRY & NEUROLOGY

## 2020-09-21 NOTE — PROCEDURES
ELECTROMYOGRAPHY (EMG) REPORT       [facility] NEUROLOGY [place]  [address] [place] [address]  Tel: [phone]  Fax: [phone]  [email or web address]     Lory ATKINS CHRISTINA Rodriguez 1985, MRN [id number]  PCP: Zahra Vladez, [phone]  Date: 2020     Principal Diagnosis: Paresthesias     Height: 5 feet 7 inch  Reason for referral: Evaluate right upper/bilateral lowers. c/o cramping in right arm/right leg and burning left leg > 2 months.       Motor NCS      Nerve / Sites Lat Amp Dist Nicholas    ms mV cm m/s   R Median - APB      Wrist 2.97 16.6 7       Elbow 7.19 16.5 26 62   R Ulnar - ADM      Wrist 2.50 8.3 7       B.Elbow 5.73 8.0 22.5 70      A.Elbow 7.14 7.4 10 71   R Peroneal - EDB      Ankle 3.75 6.4 9       Fib head 10.68 5.8 36 52      Pop fossa 12.40 5.7 10 58   L Peroneal - EDB      Ankle 4.11 8.1 9       Fib head 10.89 7.9 32 47      Pop fossa 12.86 7.6 10 51   R Tibial - AH      Ankle 5.00 7.6 10       Pop fossa 13.44 5.5 41 49   L Tibial - AH      Ankle 3.91 4.7 10       Pop fossa 12.45 4.2 42 49       F  Wave      Nerve Fmin    ms   R Ulnar - ADM 25.99   R Tibial - AH 52.71   L Tibial - AH 52.03       Sensory NCS      Nerve / Sites Onset Lat Pk Lat Amp.2-3 Dist Nicholas Lat Diff    ms ms  V cm m/s ms   R Median - II (Antidr)      Wrist 2.34 3.02 40.2 13 55    R Ulnar - V (Antidr)      Wrist 2.08 2.81 21.4 11 53    R Median, Ulnar - Transcarpal comparison      Median Palm 1.46 2.08 43.7 8 55       Ulnar Palm 1.25 1.82 30.7 8 64          0.26   R Sural - Ankle (Calf)      Calf 2.55 3.02 14.4 14 55    L Sural - Ankle (Calf)      Calf 3.39 4.11 15.7 14 41    R Superficial peroneal - Ankle      Lat leg 2.76 3.49 10.7 12 43    L Superficial peroneal - Ankle      Lat leg 2.76 3.49 11.5 12 43        EMG Summary Table     Spontaneous MUAP Rcmt Note   Muscle Fib PSW Fasc IA # Amp Dur PPP Rate Type   R. Gluteus medius None None None N N N N N N N   R. Gluteus cristhian None None  None N N N N N N N   R. L3 paraspinal None None None N N N N N N N   R. L4 paraspinal None None None N N N N N N N   R. L5 paraspinal None None None N N N N N N N   R. S1 paraspinal None None None N N N N N N N   R. Adductor leonardo None None None N N N N N N N   R. Quadriceps None None None N N N N N N N   R. Gastrocnemius (Medial head) None None None N N N N N N N   R. Tibialis anterior None None None N N N N N N N   R. Tibialis posterior None None None N N N N N N N   R. Brachioradialis None None None N N N N N N N   R. Pronator teres None None None N N N N N N N   R. Biceps brachii None None None N N N N N N N   R. Deltoid None None None N N N N N N N   R. Triceps brachii None None None N N N N N N N   R. Flexor carpi ulnaris None None None N N N N N N N   R. First dorsal interosseous None None None N N N N N N N   R. Abductor pollicis brevis None None None N N N N N N N        Summary: Nerve conduction and EMG study of right upper and lower extremities shows:  1. Normal right median, ulnar, bilateral peroneal and tibial distal motor latencies, amplitudes and conduction velocities.  2. Normal right ulnar and bilateral tibial F latencies.  3. Normal right median, ulnar, bilateral sural and superficial peroneal SNAP.  4. Disposable, monopolar needle exam of right upper and lower extremity was normal    Impression:   This is a normal nerve conduction and EMG study of right upper and lower extremities.      Andrew Hernandez MD  [facility]  (Formerly, [facility]      This note was dictated using voice recognition software.  Any grammatical or context distortions are unintentional and inherent to the software.

## 2020-09-21 NOTE — LETTER
9/21/2020         RE: Lory Rodriguez  [address]  [place] [address]        Dear Colleague,    Thank you for referring your patient, Lory Rodriguez, to the [facility] NEUROLOGY [place]. Please see a copy of my visit note below.    See procedure note for EMG finding    Again, thank you for allowing me to participate in the care of your patient.        Sincerely,        Andrew Hernandez MD

## 2020-10-17 ENCOUNTER — HOSPITAL ENCOUNTER (OUTPATIENT)
Dept: MRI IMAGING | Facility: HOSPITAL | Age: 35
Discharge: HOME | End: 2020-10-17
Attending: PSYCHIATRY & NEUROLOGY

## 2020-10-17 DIAGNOSIS — R20.2 PARESTHESIAS: ICD-10-CM

## 2020-10-19 ENCOUNTER — TELEPHONE (OUTPATIENT)
Dept: NEUROLOGY | Facility: CLINIC | Age: 35
End: 2020-10-19

## 2020-10-19 DIAGNOSIS — G37.9 DEMYELINATING DISEASE OF CENTRAL NERVOUS SYSTEM (H): Primary | ICD-10-CM

## 2020-10-19 NOTE — TELEPHONE ENCOUNTER
Please see MRI result- did you want her to do an EEG?    Narrative & Impression     EXAM: MR CERVICAL SPINE W WO CONTRAST, MR THORACIC SPINE W WO CONTRAST  LOCATION: [facility]  DATE/TIME: 10/17/2020 5:33 PM     INDICATION: Paresthesia of skin.  COMPARISON: MRI lumbar spine 07/30/2020.  CONTRAST: Gadavist 10 ml.  TECHNIQUE:   CERVICAL SPINE MRI: Without and with IV contrast.  THORACIC SPINE MRI: Without and with IV contrast.     FINDINGS:     CERVICAL SPINE:   Straightening of the usual cervical lordosis. Vertebral body height, alignment and marrow signal is normal. No evidence for pathologic enhancement. Question subtle nonenhancing T2 hyperintense lesions within the cord at the C3-C4 level (axial image 11),   dorsal left cord at the upper C7 level (axial image 24), and right lateral cord at the C5-C6 level (image 19). No extraspinal abnormality.     Craniovertebral junction and C1-C2: Normal.     C2-C3 through C7-T1: Normal disc height. Tiny central protrusion at C3-C4 and C4-C5. Normal facets. No spinal canal stenosis or neural foraminal narrowing at any level.      THORACIC SPINE:  Normal vertebral body heights, alignment and marrow signal. Normal disc heights. No herniation. Normal facets. No spinal canal or neural foraminal stenosis. No abnormal cord signal.      No extraspinal abnormality.     IMPRESSION:      CERVICAL SPINE MRI:  1.  Suggestion of tiny nonenhancing T2 hyperintense lesions within the cervical cord at the C3-C4 level, C5-C6 level and upper C7 level. These may represent small chronic demyelinating lesions. Correlate with patient history. There is no evidence for   pathologic enhancement to suggest active demyelination.  2.  Consider MRI of the brain as well to evaluate for any intracranial lesions.     THORACIC SPINE MRI:  1.  Unremarkable.

## 2020-10-20 ENCOUNTER — RECORDS - HEALTHEAST (OUTPATIENT)
Dept: SCHEDULING | Facility: CLINIC | Age: 35
End: 2020-10-20

## 2020-10-20 ENCOUNTER — RECORDS - HEALTHEAST (OUTPATIENT)
Dept: ADMINISTRATIVE | Facility: OTHER | Age: 35
End: 2020-10-20

## 2020-10-20 DIAGNOSIS — G37.9 DEMYELINATING DISEASE OF CENTRAL NERVOUS SYSTEM (H): ICD-10-CM

## 2020-10-20 NOTE — TELEPHONE ENCOUNTER
Patient informed- orders faxed to [facility]  Mariajose Napoles CMA on 10/20/2020 at 2:46 PM

## 2020-10-20 NOTE — TELEPHONE ENCOUNTER
MRI cervical spine shows questionable small areas of demyelination.  Thoracic spine MRI was normal.  Recommend:    1. MRI brain  2. Antinuclear antibody, rheumatoid factor, HTLV 1/2 antibody, vitamin D, SSA/SSB antibody  3. B12, RPR, vitamin B1, vitamin D, normal.  Previously celiac disease profile was negative  4. See orders

## 2020-11-30 ENCOUNTER — RECORDS - HEALTHEAST (OUTPATIENT)
Dept: SCHEDULING | Facility: CLINIC | Age: 35
End: 2020-11-30

## 2020-11-30 ENCOUNTER — VIRTUAL VISIT (OUTPATIENT)
Dept: NEUROLOGY | Facility: CLINIC | Age: 35
End: 2020-11-30
Payer: COMMERCIAL

## 2020-11-30 ENCOUNTER — RECORDS - HEALTHEAST (OUTPATIENT)
Dept: ADMINISTRATIVE | Facility: OTHER | Age: 35
End: 2020-11-30

## 2020-11-30 VITALS — BODY MASS INDEX: 42.38 KG/M2 | WEIGHT: 270 LBS | HEIGHT: 67 IN

## 2020-11-30 DIAGNOSIS — G37.9 DEMYELINATING DISEASE (H): ICD-10-CM

## 2020-11-30 DIAGNOSIS — N63.20 LEFT BREAST LUMP: ICD-10-CM

## 2020-11-30 PROCEDURE — 99214 OFFICE O/P EST MOD 30 MIN: CPT | Mod: 95 | Performed by: PSYCHIATRY & NEUROLOGY

## 2020-11-30 ASSESSMENT — MIFFLIN-ST. JEOR: SCORE: 1942.34

## 2020-11-30 NOTE — PROGRESS NOTES
NEUROLOGY FOLLOW UP VISIT  NOTE       [facility] NEUROLOGY [place]  [address] [place] [address]  Tel: [phone]  Fax: [phone]  [email or web address]     Lory Rodriguez,  1985, MRN [id number]  PCP: Zahra Valdez, [phone]  Date: 2020     ASSESSMENT & PLAN     Diagnosis code: Demyelinating disease     Demyelinating disease  35-year-old morbidly obese female with history of hypertension and possible Crohn's disease who reported right arm and leg cramping with change in position.  MRI of cervical spine shows demyelinating lesions but thoracic spine MRI was normal.  Lab work included normal B12, folic acid, RPR and alpha-tocopherol.  I have recommended:    1.  MRI brain with and without contrast  2.  Check antinuclear antibody, angiotensin-converting enzyme, HTLV 1/2 antibody, anti-MO G antibody, neuromyelitis optica and vitamin D level.  3.  Follow-up after above tests for face-to-face visit    Thank you again for this referral, please feel free to contact me if you have any questions.    Andrew Hernandez MD  [facility]  (Former[facility], [facility]     HISTORY OF PRESENT ILLNESS     Patient is a morbidly obese 35-year-old female with history of hypertension, Crohn's disease who was referred for evaluation of right leg along with cramping sensation that started in 2020 and patient had a peculiar description of her symptoms and reported that if she was sitting for long period of time or laying down she would notice as if her leg would going to cramp.  She had MRI of the lumbar spine that was unremarkable but cervical spine MRI showed a tiny nonenhancing T2 hyperintense lesion within the cervical cord at C3-C4, C5-C6 and upper C7 level.  These may represent small chronic demyelinating lesions.  Thoracic spine MRI was negative.  She had lab work that included normal folate, vitamin B12, RPR, vitamin B1,  "alpha-tocopherol and gamma tocopherol.  MRI brain and lab work including antinuclear antibody, rheumatoid factor, HTLV 1/2 antibody, vitamin D, SSA/SSB antibody, anti-MOG antibody and neuromyelitis optica Antibody was ordered but patient has not yet done those test and requests new orders to get those test done.     PROBLEM LIST   Patient Active Problem List   Diagnosis Code     Gestational hypertension affecting eighth pregnancy O13.9, O09.40     Crohn's disease (H) K50.90     Morbid obesity (H) E66.01     Benign essential hypertension I10     Demyelinating disease (H) G37.9         PAST MEDICAL & SURGICAL HISTORY     Past Medical History:   Patient  has a past medical history of Hypertension.    Surgical History:  She  has no past surgical history on file.     SOCIAL HISTORY     Reviewed, and she  reports that she has been smoking cigarettes. She has a 2.50 pack-year smoking history. She has never used smokeless tobacco. She reports previous alcohol use. She reports previous drug use.     FAMILY HISTORY     Reviewed, and family history includes Migraines in her sister; Seizure Disorder in her brother.     ALLERGIES     No Known Allergies      REVIEW OF SYSTEMS     A 12 point review of system was performed and was negative except as outlined in the history of present illness.     HOME MEDICATIONS       Current Outpatient Medications:      acetaminophen (TYLENOL) 500 MG tablet, Take 1,000 mg by mouth, Disp: , Rfl:      FERROUS FUMARATE 324 MG TABS tablet, Take 324 mg by mouth daily, Disp: , Rfl:      VIORELE 0.15-0.02/0.01 MG (21/5) tablet, TK 1 T PO QD, Disp: , Rfl:      budesonide (ENTOCORT EC) 3 MG EC capsule, Take 3 mg by mouth daily, Disp: , Rfl:      ibuprofen (ADVIL/MOTRIN) 400 MG tablet, Take 400 mg by mouth as needed, Disp: , Rfl:      NIFEdipine ER OSMOTIC (PROCARDIA XL) 30 MG 24 hr tablet, Take 30 mg by mouth daily, Disp: , Rfl:       PHYSICAL EXAM     Vital signs  Ht 1.702 m (5' 7\")   Wt 122.5 kg (270 " "lb)   BMI 42.29 kg/m      Weight:   270 lbs 0 oz    Patient is alert and oriented x4 in no acute distress.  Vital signs are reviewed and are documented in electronic medical record.  Speech normal with no dysarthria or aphasia.  Cranial nerves II through XII are intact.  She moves all 4 extremities reflexes symmetrical no dysmetria noted on finger-nose testing gait testing     DIAGNOSTIC STUDIES     PERTINENT RADIOLOGY  Following imaging studies were reviewed:     MRI CERVICAL AND THORACIC SPINE 10/17/2020  CERVICAL SPINE MRI:  1.  Suggestion of tiny nonenhancing T2 hyperintense lesions within the cervical cord at the C3-C4 level, C5-C6 level and upper C7 level. These may represent small chronic demyelinating lesions. Correlate with patient history. There is no evidence for   pathologic enhancement to suggest active demyelination.  2.  Consider MRI of the brain as well to evaluate for any intracranial lesions.     THORACIC SPINE MRI:  1.  Unremarkable.    MRI LUMBAR SPINE 7/30/2020  1. Mild facet arthropathy at L4-L5 and L5-S1.  2. Otherwise unremarkable lumbar spine MRI.     PERTINENT LABS  Following labs were reviewed:     Ref. Range 9/8/2020 10:13   Folate Latest Ref Range: >=3.5 ng/mL 10.2   Vitamin B-12 Latest Ref Range: 213 - 816 pg/mL 338   Treponema Antibody (Syphilis) Latest Ref Range: Negative  Negative   Vitamin B1, Whole Blood Latest Ref Range: 70 - 180 nmol/L 82   Vitamin E (Alpha-Tocopherol) Latest Ref Range: 5.5 - 18.0 mg/L 7.6   Vitamin E (Gamma-Tocopherol) Latest Ref Range: 0.0 - 6.0 mg/L 1.0         TELEPHONE VISIT CONSENT   This telephone visit was done in lieu of a face to face visit due to COVID 19 pandemic. The patient has been notified of following:    \"This telephone visit will be conducted via a call between you and your physician/provider. We have found that certain health care needs can be provided without the need for a physical exam. This service lets us provide the care you need with a " "short phone conversation. If a prescription is necessary we can send it directly to your pharmacy. If lab work is needed we can place an order for that and you can then stop by our lab to have the test done at a later time. If during the course of the call the physician/provider feels a telephone visit is not appropriate, you will not be charged for this service.\"    Patient has given verbal consent for Telephone visit? YES  Consent has been obtained for this service by 1 care team member: YES    Total Time: visit 30 minutes         Total time spent for face to face visit, reviewing labs/imaging studies, counseling and coordination of care was: 30 Minutes More than 50% of this time was spent on counseling and coordination of care.      This note was dictated using voice recognition software.  Any grammatical or context distortions are unintentional and inherent to the software.         "

## 2020-11-30 NOTE — LETTER
2020         RE: Lory Rodriguez  [address]  [place] [address]        Dear Colleague,    Thank you for referring your patient, Lory Rodriguez, to the [facility] NEUROLOGY [facility]IC [place]. Please see a copy of my visit note below.    NEUROLOGY FOLLOW UP VISIT  NOTE       [facility] NEUROLOGY [place]  [address] [place] [address]  Tel: [phone]  Fax: [phone]  [email or web address]     Lory Rodriguez,  1985, MRN [id number]  PCP: Zahra Valdez, [phone]  Date: 2020     ASSESSMENT & PLAN     Diagnosis code: Demyelinating disease     Demyelinating disease  35-year-old morbidly obese female with history of hypertension and possible Crohn's disease who reported right arm and leg cramping with change in position.  MRI of cervical spine shows demyelinating lesions but thoracic spine MRI was normal.  Lab work included normal B12, folic acid, RPR and alpha-tocopherol.  I have recommended:    1.  MRI brain with and without contrast  2.  Check antinuclear antibody, angiotensin-converting enzyme, HTLV 1/2 antibody, anti-MO G antibody, neuromyelitis optica and vitamin D level.  3.  Follow-up after above tests for face-to-face visit    Thank you again for this referral, please feel free to contact me if you have any questions.    Andrew Hernandez MD  [facility]  (Former[facility], [facility]     HISTORY OF PRESENT ILLNESS     Patient is a morbidly obese 35-year-old female with history of hypertension, Crohn's disease who was referred for evaluation of right leg along with cramping sensation that started in 2020 and patient had a peculiar description of her symptoms and reported that if she was sitting for long period of time or laying down she would notice as if her leg would going to cramp.  She had MRI of the lumbar spine that was unremarkable but cervical spine MRI  showed a tiny nonenhancing T2 hyperintense lesion within the cervical cord at C3-C4, C5-C6 and upper C7 level.  These may represent small chronic demyelinating lesions.  Thoracic spine MRI was negative.  She had lab work that included normal folate, vitamin B12, RPR, vitamin B1, alpha-tocopherol and gamma tocopherol.  MRI brain and lab work including antinuclear antibody, rheumatoid factor, HTLV 1/2 antibody, vitamin D, SSA/SSB antibody, anti-MOG antibody and neuromyelitis optica Antibody was ordered but patient has not yet done those test and requests new orders to get those test done.     PROBLEM LIST   Patient Active Problem List   Diagnosis Code     Gestational hypertension affecting eighth pregnancy O13.9, O09.40     Crohn's disease (H) K50.90     Morbid obesity (H) E66.01     Benign essential hypertension I10     Demyelinating disease (H) G37.9         PAST MEDICAL & SURGICAL HISTORY     Past Medical History:   Patient  has a past medical history of Hypertension.    Surgical History:  She  has no past surgical history on file.     SOCIAL HISTORY     Reviewed, and she  reports that she has been smoking cigarettes. She has a 2.50 pack-year smoking history. She has never used smokeless tobacco. She reports previous alcohol use. She reports previous drug use.     FAMILY HISTORY     Reviewed, and family history includes Migraines in her sister; Seizure Disorder in her brother.     ALLERGIES     No Known Allergies      REVIEW OF SYSTEMS     A 12 point review of system was performed and was negative except as outlined in the history of present illness.     HOME MEDICATIONS       Current Outpatient Medications:      acetaminophen (TYLENOL) 500 MG tablet, Take 1,000 mg by mouth, Disp: , Rfl:      FERROUS FUMARATE 324 MG TABS tablet, Take 324 mg by mouth daily, Disp: , Rfl:      VIORELE 0.15-0.02/0.01 MG (21/5) tablet, TK 1 T PO QD, Disp: , Rfl:      budesonide (ENTOCORT EC) 3 MG EC capsule, Take 3 mg by mouth daily,  "Disp: , Rfl:      ibuprofen (ADVIL/MOTRIN) 400 MG tablet, Take 400 mg by mouth as needed, Disp: , Rfl:      NIFEdipine ER OSMOTIC (PROCARDIA XL) 30 MG 24 hr tablet, Take 30 mg by mouth daily, Disp: , Rfl:       PHYSICAL EXAM     Vital signs  Ht 1.702 m (5' 7\")   Wt 122.5 kg (270 lb)   BMI 42.29 kg/m      Weight:   270 lbs 0 oz    Patient is alert and oriented x4 in no acute distress.  Vital signs are reviewed and are documented in electronic medical record.  Speech normal with no dysarthria or aphasia.  Cranial nerves II through XII are intact.  She moves all 4 extremities reflexes symmetrical no dysmetria noted on finger-nose testing gait testing     DIAGNOSTIC STUDIES     PERTINENT RADIOLOGY  Following imaging studies were reviewed:     MRI CERVICAL AND THORACIC SPINE 10/17/2020  CERVICAL SPINE MRI:  1.  Suggestion of tiny nonenhancing T2 hyperintense lesions within the cervical cord at the C3-C4 level, C5-C6 level and upper C7 level. These may represent small chronic demyelinating lesions. Correlate with patient history. There is no evidence for   pathologic enhancement to suggest active demyelination.  2.  Consider MRI of the brain as well to evaluate for any intracranial lesions.     THORACIC SPINE MRI:  1.  Unremarkable.    MRI LUMBAR SPINE 7/30/2020  1. Mild facet arthropathy at L4-L5 and L5-S1.  2. Otherwise unremarkable lumbar spine MRI.     PERTINENT LABS  Following labs were reviewed:     Ref. Range 9/8/2020 10:13   Folate Latest Ref Range: >=3.5 ng/mL 10.2   Vitamin B-12 Latest Ref Range: 213 - 816 pg/mL 338   Treponema Antibody (Syphilis) Latest Ref Range: Negative  Negative   Vitamin B1, Whole Blood Latest Ref Range: 70 - 180 nmol/L 82   Vitamin E (Alpha-Tocopherol) Latest Ref Range: 5.5 - 18.0 mg/L 7.6   Vitamin E (Gamma-Tocopherol) Latest Ref Range: 0.0 - 6.0 mg/L 1.0         TELEPHONE VISIT CONSENT   This telephone visit was done in lieu of a face to face visit due to COVID 19 pandemic. The " "patient has been notified of following:    \"This telephone visit will be conducted via a call between you and your physician/provider. We have found that certain health care needs can be provided without the need for a physical exam. This service lets us provide the care you need with a short phone conversation. If a prescription is necessary we can send it directly to your pharmacy. If lab work is needed we can place an order for that and you can then stop by our lab to have the test done at a later time. If during the course of the call the physician/provider feels a telephone visit is not appropriate, you will not be charged for this service.\"    Patient has given verbal consent for Telephone visit? YES  Consent has been obtained for this service by 1 care team member: YES    Total Time: visit 30 minutes         Total time spent for face to face visit, reviewing labs/imaging studies, counseling and coordination of care was: 30 Minutes More than 50% of this time was spent on counseling and coordination of care.      This note was dictated using voice recognition software.  Any grammatical or context distortions are unintentional and inherent to the software.             Again, thank you for allowing me to participate in the care of your patient.        Sincerely,        Andrew Hernandez MD    "

## 2020-12-14 ENCOUNTER — HOSPITAL ENCOUNTER (OUTPATIENT)
Dept: MAMMOGRAPHY | Facility: CLINIC | Age: 35
Discharge: HOME | End: 2020-12-14
Attending: OBSTETRICS & GYNECOLOGY

## 2020-12-14 DIAGNOSIS — N63.20 LEFT BREAST LUMP: ICD-10-CM

## 2021-05-27 NOTE — PROGRESS NOTES
"Please see \"Imaging\" tab under \"Chart Review\" for details of today's visit.    Garland Garcia        "

## 2021-05-28 NOTE — ANESTHESIA PROCEDURE NOTES
Peripheral Block    Patient location during procedure: OR  Start time: 5/8/2019 4:40 AM  End time: 5/8/2019 4:46 AM  post-op analgesia per surgeon order as noted in medical record  Staffing:  Performing  Anesthesiologist: Jacklyn Jamil MD  Preanesthetic Checklist  Completed: patient identified, site marked, risks, benefits, and alternatives discussed, timeout performed, consent obtained, airway assessed, oxygen available, suction available, emergency drugs available and hand hygiene performed  Peripheral Block  Block type: other, TAP  Prep: ChloraPrep  Laterality: bilateral  Injection technique: ultrasound guided    Ultrasound used to visualize needle placement in proximity to nerve being blocked: yes   Permanent ultrasound image captured for medical record  Sterile gel and probe cover used for ultrasound.    Needle  Needle type: echogenic   Needle gauge: 20G  Needle length: 6 in  no peripheral nerve catheter placed  Assessment  Injection assessment: no difficulty with injection, negative aspiration for heme and incremental injection

## 2021-05-28 NOTE — ANESTHESIA PREPROCEDURE EVALUATION
Anesthesia Evaluation      Patient summary reviewed   No history of anesthetic complications     Airway   Mallampati: II  Neck ROM: full   Pulmonary                           Cardiovascular   (+) hypertension poorly controlled, ,      Neuro/Psych - negative ROS     Endo/Other - negative ROS      GI/Hepatic/Renal - negative ROS           Dental                         Anesthesia Plan  Planned anesthetic: epidural and peripheral nerve block  TAP blocks for POP per surgeon request.  Duramorph.  ASA 3     Anesthetic plan and risks discussed with: patient    Post-op plan: epidural analgesia      Epidural working well, will plan to use to Caesarian.

## 2021-05-28 NOTE — ANESTHESIA CARE TRANSFER NOTE
Last vitals:   Vitals:    05/08/19 0501   BP: 139/77   Pulse: 100   Resp: 16   Temp: 36.8  C (98.2  F)   SpO2: 100%     Patient's level of consciousness is drowsy  Spontaneous respirations: yes  Maintains airway independently: yes  Dentition unchanged: yes  Oropharynx: oropharynx clear of all foreign objects    QCDR Measures:  ASA# 20 - Surgical Safety Checklist: WHO surgical safety checklist completed prior to induction    PQRS# 430 - Adult PONV Prevention: 4558F - Pt received => 2 anti-emetic agents (different classes) preop & intraop  ASA# 8 - Peds PONV Prevention: NA - Not pediatric patient, not GA or 2 or more risk factors NOT present  PQRS# 424 - Osiris-op Temp Management: 4559F - At least one body temp DOCUMENTED => 35.5C or 95.9F within required timeframe  PQRS# 426 - PACU Transfer Protocol: - Transfer of care checklist used  ASA# 14 - Acute Post-op Pain: ASA14B - Patient did NOT experience pain >= 7 out of 10

## 2021-05-28 NOTE — ANESTHESIA PROCEDURE NOTES
Epidural Block    Patient location during procedure: OB  Time Called: 5/7/2019 8:15 PM  Reason for Block:labor epidural  Staffing:  Performing  Anesthesiologist: Jacklyn Jamil MD  Preanesthetic Checklist  Completed: patient identified, risks, benefits, and alternatives discussed, timeout performed, consent obtained, at patient's request, airway assessed, oxygen available, suction available, emergency drugs available and hand hygiene performed  Procedure  Patient position: sitting  Prep: ChloraPrep  Patient monitoring: continuous pulse oximetry, heart rate and blood pressure  Approach: midline  Location: L3-L4  Injection technique: KEEGAN saline    Needle  Needle type: Odalis   Needle gauge: 18 G     Catheter in Space: 1  Assessment  Sensory level:  No complications

## 2021-05-28 NOTE — TELEPHONE ENCOUNTER
Outreach Nurse called for a follow up phone call. Lory was just walking out the door to take  to her first appointment. Asked this writer to call back later.  Jeimy Angel RN

## 2021-05-28 NOTE — ANESTHESIA POSTPROCEDURE EVALUATION
Patient: Lory Rodriguez   SECTION  Anesthesia type: epidural    Patient location: PACU  Last vitals:   Vitals Value Taken Time   /89 2019  5:22 AM   Temp  2019  5:28 AM   Pulse 103 2019  5:26 AM   Resp  2019  5:28 AM   SpO2 97 % 2019  5:26 AM   Vitals shown include unvalidated device data.  Post vital signs: stable  Level of consciousness: awake and responds to simple questions  Post-anesthesia pain: pain controlled  Post-anesthesia nausea and vomiting: no  Pulmonary: unassisted, return to baseline  Cardiovascular: stable and blood pressure at baseline  Hydration: adequate  Anesthetic events: no    QCDR Measures:  ASA# 11 - Osiris-op Cardiac Arrest: ASA11B - Patient did NOT experience unanticipated cardiac arrest  ASA# 12 - Osiris-op Mortality Rate: ASA12B - Patient did NOT die  ASA# 13 - PACU Re-Intubation Rate: NA - No ETT / LMA used for case  ASA# 10 - Composite Anes Safety: ASA10A - No serious adverse event    Additional Notes:

## 2021-05-29 NOTE — TELEPHONE ENCOUNTER
OB Follow Up Phone Call    Patient: Lory Rodriguez  : 1985  MRN: [id number]     Location [facility] P[facility]  Provider Zahra Valdez CNP      :   N/A    Language:   English    Discharge Follow-Up:  Follow-Up call by Outreach nurse: Message left for patient    Type of Delivery:      Feeding Method:  Breastfeeding and Formula    Comments:   Left message with [facility]aternity [facility] Outreach phone number for patient to call back if desired. Reminded patient to schedule postpartum follow-up appointment as directed by PCP at discharge.  Encouraged patient to call clinic/PCP with questions or concerns.    Completed by:   Jeimy Angel RN      Patient: Lory Rodriguez  : 1985  MRN: [id number]

## 2021-06-03 VITALS — HEIGHT: 67 IN | WEIGHT: 259.6 LBS | BODY MASS INDEX: 40.74 KG/M2

## 2021-06-22 NOTE — PROGRESS NOTES
Briana Pearson D.O.  [facility]  [place], [place]    Dear Briana,    I met with your patient Lory Rodriguez for a history of  delivery with  loss after a 26-week vaginal delivery in 2010, followed by a second pregnancy within 1 year with 12-week cerclage, PROM at 26 weeks and vaginal delivery at 28 weeks. This pregnancy was  but the  survived and is a healthy female. Ms. Rodriguez reported symptoms consistent with  labor with her first pregnancy and with PROM followed by  labor with her second pregnancy, which she delivered in 2011. She is not aware of any cervical length assessments with either delivery. She is morbidly obese but reports no other comorbidities. Ms. Rodriguez does not smoke, consume alcohol and her caffeine intake is limited to 1 cup a day.    In summary, Ms. Rodriguez  history is significant for  labor and PROM but not for cervical insufficiency. She is here today for recommendations for care.     Impression and recommendations:    I have discussed pathways to  delivery including  contractions,  rupture of membranes and cervical insufficiency. These events can be due to multiple etiologies and can often overlap.    We reviewed interventions to decrease risk of  delivery including but not limited to cerclage, vaginal progesterone suppositories for short cervix, and 17-OHPC injections.     After carefully reviewing indications for cerclage, including history indicated, ultrasound indicated, and physical exam indicated cerclage. I have explained that although cerclage is used to decrease the risk of  delivery, cannot guarantee prevention in all cases. I have also explained that despite having had two  births, these were clinically not consistent with a diagnosis of cervical insufficiency, and would not support placement of repeat cerclage, unless cervical shortening  diagnosed during this pregnancy. I have also addressed use of vaginal progesterone 100-200 mg used at bedtime up to 36 weeks when cervical length found to be less than 20 mm in length although it is often implemented instead of cerclage for cervical length less than 25 mm. This intervention has been shown to be equally effective as cervical cerclage for prevention of  birth. With progressively shorter cervical length, there is evidence that cerclage may provide greater benefit in terms of time to delivery.    After careful review of options, the patient consents to proceed with weekly 17-OHPC injections. Ms. Rodriguez is aware that this intervention was shown to reduce risk of  delivery prior to 32 weeks by 30-40% and prior to 37 weeks by 40-50%.    She also agrees with repeat CL length measurements up to 24 weeks, with option of either cerclage or vaginal progesterone if cervical length less than 25 mm before 24 weeks.    At the conclusion of our conversation, the patient appeared to have a clear grasp and understanding of the content of our conversation based on the appropriate questions that she has asked.    Plan:  1. Begin 17-OH progesterone caproate ([place]) injections 250 mg IM weekly. This has been arranged through your office.  2. Return for cervical length assessment and completion of fetal anatomy in 2 weeks, followed by CL assessment in 4 weeks. After 24 weeks, clinical assessment for evidence of cervical change takes priority over ultrasound assessment.    I spent 30 minutes face-to-face with this patient counseling and coordinating care as described above. More than 50% of the time was in coordination of care and discussion of management of care.  A copy of this consultation is being faxed to your office.    Sincerely,  Julio Cesar Gomes M.D.  Maternal Fetal Medicine

## 2021-06-23 NOTE — PROGRESS NOTES
Patient was seen in [facility] clinic on 11/28/18.  At that time a follow-up was recommended in 2 weeks. Spoke with Deepika from [facility] who advised that patient had this exam done in their office on 12/11/18 and that they will see her for more follow-up.  Removing orders.

## 2021-06-24 NOTE — PROGRESS NOTES
"Please see \"Imaging\" tab under \"Chart Review\" for details of today's visit.    aGrland Garcia        "

## 2021-09-07 ENCOUNTER — LAB REQUISITION (OUTPATIENT)
Dept: LAB | Facility: CLINIC | Age: 36
End: 2021-09-07
Payer: COMMERCIAL

## 2021-09-07 DIAGNOSIS — Z03.818 ENCOUNTER FOR OBSERVATION FOR SUSPECTED EXPOSURE TO OTHER BIOLOGICAL AGENTS RULED OUT: ICD-10-CM

## 2021-09-07 PROCEDURE — U0003 INFECTIOUS AGENT DETECTION BY NUCLEIC ACID (DNA OR RNA); SEVERE ACUTE RESPIRATORY SYNDROME CORONAVIRUS 2 (SARS-COV-2) (CORONAVIRUS DISEASE [COVID-19]), AMPLIFIED PROBE TECHNIQUE, MAKING USE OF HIGH THROUGHPUT TECHNOLOGIES AS DESCRIBED BY CMS-2020-01-R: HCPCS | Mod: ORL | Performed by: NURSE PRACTITIONER

## 2021-09-08 LAB — SARS-COV-2 RNA RESP QL NAA+PROBE: NEGATIVE

## 2021-09-16 ENCOUNTER — LAB REQUISITION (OUTPATIENT)
Dept: LAB | Facility: CLINIC | Age: 36
End: 2021-09-16
Payer: COMMERCIAL

## 2021-09-16 DIAGNOSIS — I10 ESSENTIAL (PRIMARY) HYPERTENSION: ICD-10-CM

## 2021-09-16 DIAGNOSIS — E83.42 HYPOMAGNESEMIA: ICD-10-CM

## 2021-09-16 DIAGNOSIS — Z13.6 ENCOUNTER FOR SCREENING FOR CARDIOVASCULAR DISORDERS: ICD-10-CM

## 2021-09-16 DIAGNOSIS — D50.0 IRON DEFICIENCY ANEMIA SECONDARY TO BLOOD LOSS (CHRONIC): ICD-10-CM

## 2021-09-16 DIAGNOSIS — Z01.419 ENCOUNTER FOR GYNECOLOGICAL EXAMINATION (GENERAL) (ROUTINE) WITHOUT ABNORMAL FINDINGS: ICD-10-CM

## 2021-09-16 LAB
ALBUMIN SERPL-MCNC: 3.2 G/DL (ref 3.5–5)
ALP SERPL-CCNC: 60 U/L (ref 45–120)
ALT SERPL W P-5'-P-CCNC: 10 U/L (ref 0–45)
ANION GAP SERPL CALCULATED.3IONS-SCNC: 9 MMOL/L (ref 5–18)
AST SERPL W P-5'-P-CCNC: 13 U/L (ref 0–40)
BILIRUB SERPL-MCNC: 0.2 MG/DL (ref 0–1)
BUN SERPL-MCNC: 10 MG/DL (ref 8–22)
CALCIUM SERPL-MCNC: 9.6 MG/DL (ref 8.5–10.5)
CHLORIDE BLD-SCNC: 107 MMOL/L (ref 98–107)
CHOLEST SERPL-MCNC: 174 MG/DL
CO2 SERPL-SCNC: 22 MMOL/L (ref 22–31)
CREAT SERPL-MCNC: 0.8 MG/DL (ref 0.6–1.1)
ERYTHROCYTE [DISTWIDTH] IN BLOOD BY AUTOMATED COUNT: 14.5 % (ref 10–15)
GFR SERPL CREATININE-BSD FRML MDRD: >90 ML/MIN/1.73M2
GLUCOSE BLD-MCNC: 69 MG/DL (ref 70–125)
HCT VFR BLD AUTO: 37.2 % (ref 35–47)
HDLC SERPL-MCNC: 57 MG/DL
HGB BLD-MCNC: 11.5 G/DL (ref 11.7–15.7)
LDLC SERPL CALC-MCNC: 99 MG/DL
MAGNESIUM SERPL-MCNC: 1.7 MG/DL (ref 1.8–2.6)
MCH RBC QN AUTO: 24.5 PG (ref 26.5–33)
MCHC RBC AUTO-ENTMCNC: 30.9 G/DL (ref 31.5–36.5)
MCV RBC AUTO: 79 FL (ref 78–100)
PLATELET # BLD AUTO: 381 10E3/UL (ref 150–450)
POTASSIUM BLD-SCNC: 4.1 MMOL/L (ref 3.5–5)
PROT SERPL-MCNC: 6.2 G/DL (ref 6–8)
RBC # BLD AUTO: 4.7 10E6/UL (ref 3.8–5.2)
SODIUM SERPL-SCNC: 138 MMOL/L (ref 136–145)
TRIGL SERPL-MCNC: 90 MG/DL
WBC # BLD AUTO: 5.9 10E3/UL (ref 4–11)

## 2021-09-16 PROCEDURE — 85027 COMPLETE CBC AUTOMATED: CPT | Mod: ORL | Performed by: NURSE PRACTITIONER

## 2021-09-16 PROCEDURE — 36415 COLL VENOUS BLD VENIPUNCTURE: CPT | Mod: ORL | Performed by: NURSE PRACTITIONER

## 2021-09-16 PROCEDURE — 87624 HPV HI-RISK TYP POOLED RSLT: CPT | Mod: ORL | Performed by: NURSE PRACTITIONER

## 2021-09-16 PROCEDURE — 80061 LIPID PANEL: CPT | Mod: ORL | Performed by: NURSE PRACTITIONER

## 2021-09-16 PROCEDURE — 80053 COMPREHEN METABOLIC PANEL: CPT | Mod: ORL | Performed by: NURSE PRACTITIONER

## 2021-09-16 PROCEDURE — G0123 SCREEN CERV/VAG THIN LAYER: HCPCS | Mod: ORL | Performed by: NURSE PRACTITIONER

## 2021-09-16 PROCEDURE — 83735 ASSAY OF MAGNESIUM: CPT | Mod: ORL | Performed by: NURSE PRACTITIONER

## 2021-09-21 LAB
HUMAN PAPILLOMA VIRUS 16 DNA: NEGATIVE
HUMAN PAPILLOMA VIRUS 18 DNA: NEGATIVE
HUMAN PAPILLOMA VIRUS FINAL DIAGNOSIS: NORMAL
HUMAN PAPILLOMA VIRUS OTHER HR: NEGATIVE

## 2021-09-24 LAB
BKR LAB AP GYN ADEQUACY: NORMAL
BKR LAB AP GYN INTERPRETATION: NORMAL
BKR LAB AP HPV REFLEX: NORMAL
BKR LAB AP LMP: NORMAL
BKR LAB AP PREVIOUS ABNORMAL: NORMAL
PATH REPORT.COMMENTS IMP SPEC: NORMAL
PATH REPORT.RELEVANT HX SPEC: NORMAL

## 2021-11-30 ENCOUNTER — LAB REQUISITION (OUTPATIENT)
Dept: LAB | Facility: CLINIC | Age: 36
End: 2021-11-30
Payer: COMMERCIAL

## 2021-11-30 DIAGNOSIS — Z03.818 ENCOUNTER FOR OBSERVATION FOR SUSPECTED EXPOSURE TO OTHER BIOLOGICAL AGENTS RULED OUT: ICD-10-CM

## 2021-11-30 PROCEDURE — U0003 INFECTIOUS AGENT DETECTION BY NUCLEIC ACID (DNA OR RNA); SEVERE ACUTE RESPIRATORY SYNDROME CORONAVIRUS 2 (SARS-COV-2) (CORONAVIRUS DISEASE [COVID-19]), AMPLIFIED PROBE TECHNIQUE, MAKING USE OF HIGH THROUGHPUT TECHNOLOGIES AS DESCRIBED BY CMS-2020-01-R: HCPCS | Mod: ORL | Performed by: FAMILY MEDICINE

## 2021-12-01 LAB — SARS-COV-2 RNA RESP QL NAA+PROBE: NEGATIVE

## 2023-02-02 ENCOUNTER — LAB REQUISITION (OUTPATIENT)
Dept: LAB | Facility: CLINIC | Age: 38
End: 2023-02-02

## 2023-02-02 DIAGNOSIS — I10 ESSENTIAL (PRIMARY) HYPERTENSION: ICD-10-CM

## 2023-02-02 LAB
ALBUMIN SERPL BCG-MCNC: 3.4 G/DL (ref 3.5–5.2)
ALP SERPL-CCNC: 53 U/L (ref 35–104)
ALT SERPL W P-5'-P-CCNC: 14 U/L (ref 10–35)
ANION GAP SERPL CALCULATED.3IONS-SCNC: 13 MMOL/L (ref 7–15)
AST SERPL W P-5'-P-CCNC: 19 U/L (ref 10–35)
BILIRUB SERPL-MCNC: <0.2 MG/DL
BUN SERPL-MCNC: 11 MG/DL (ref 6–20)
CALCIUM SERPL-MCNC: 8.9 MG/DL (ref 8.6–10)
CHLORIDE SERPL-SCNC: 108 MMOL/L (ref 98–107)
CREAT SERPL-MCNC: 0.94 MG/DL (ref 0.51–0.95)
DEPRECATED HCO3 PLAS-SCNC: 18 MMOL/L (ref 22–29)
GFR SERPL CREATININE-BSD FRML MDRD: 79 ML/MIN/1.73M2
GLUCOSE SERPL-MCNC: 88 MG/DL (ref 70–99)
POTASSIUM SERPL-SCNC: 3.7 MMOL/L (ref 3.4–5.3)
PROT SERPL-MCNC: 5.4 G/DL (ref 6.4–8.3)
SODIUM SERPL-SCNC: 139 MMOL/L (ref 136–145)

## 2023-02-02 PROCEDURE — 80053 COMPREHEN METABOLIC PANEL: CPT | Performed by: NURSE PRACTITIONER

## 2023-03-16 ENCOUNTER — HOSPITAL ENCOUNTER (OUTPATIENT)
Dept: MRI IMAGING | Facility: CLINIC | Age: 38
Discharge: HOME | End: 2023-03-16
Attending: PHYSICIAN ASSISTANT | Admitting: PHYSICIAN ASSISTANT
Payer: COMMERCIAL

## 2023-03-16 DIAGNOSIS — K50.80 CROHN'S DISEASE OF BOTH SMALL AND LARGE INTESTINE WITHOUT COMPLICATION (H): ICD-10-CM

## 2023-03-16 PROCEDURE — 250N000011 HC RX IP 250 OP 636: Performed by: RADIOLOGY

## 2023-03-16 PROCEDURE — A9585 GADOBUTROL INJECTION: HCPCS | Performed by: PHYSICIAN ASSISTANT

## 2023-03-16 PROCEDURE — 255N000002 HC RX 255 OP 636: Performed by: PHYSICIAN ASSISTANT

## 2023-03-16 PROCEDURE — 72197 MRI PELVIS W/O & W/DYE: CPT

## 2023-03-16 RX ORDER — GADOBUTROL 604.72 MG/ML
10 INJECTION INTRAVENOUS ONCE
Status: COMPLETED | OUTPATIENT
Start: 2023-03-16 | End: 2023-03-16

## 2023-03-16 RX ADMIN — GADOBUTROL 10 ML: 604.72 INJECTION INTRAVENOUS at 08:57

## 2023-03-16 RX ADMIN — GLUCAGON 0.5 MG: 1 INJECTION, POWDER, LYOPHILIZED, FOR SOLUTION INTRAMUSCULAR; INTRAVENOUS at 08:40

## 2023-03-16 RX ADMIN — GLUCAGON 0.5 MG: 1 INJECTION, POWDER, LYOPHILIZED, FOR SOLUTION INTRAMUSCULAR; INTRAVENOUS at 08:10

## 2024-05-21 ENCOUNTER — LAB REQUISITION (OUTPATIENT)
Dept: LAB | Facility: CLINIC | Age: 39
End: 2024-05-21

## 2024-05-21 DIAGNOSIS — E83.42 HYPOMAGNESEMIA: ICD-10-CM

## 2024-05-21 DIAGNOSIS — E55.9 VITAMIN D DEFICIENCY, UNSPECIFIED: ICD-10-CM

## 2024-05-21 DIAGNOSIS — I10 ESSENTIAL (PRIMARY) HYPERTENSION: ICD-10-CM

## 2024-05-21 DIAGNOSIS — E53.8 DEFICIENCY OF OTHER SPECIFIED B GROUP VITAMINS: ICD-10-CM

## 2024-05-21 PROCEDURE — 82306 VITAMIN D 25 HYDROXY: CPT | Performed by: NURSE PRACTITIONER

## 2024-05-21 PROCEDURE — 84443 ASSAY THYROID STIM HORMONE: CPT | Performed by: NURSE PRACTITIONER

## 2024-05-21 PROCEDURE — 80053 COMPREHEN METABOLIC PANEL: CPT | Performed by: NURSE PRACTITIONER

## 2024-05-21 PROCEDURE — 83735 ASSAY OF MAGNESIUM: CPT | Performed by: NURSE PRACTITIONER

## 2024-05-21 PROCEDURE — 82607 VITAMIN B-12: CPT | Performed by: NURSE PRACTITIONER

## 2024-05-21 PROCEDURE — 84439 ASSAY OF FREE THYROXINE: CPT | Performed by: NURSE PRACTITIONER

## 2024-05-22 LAB
ALBUMIN SERPL BCG-MCNC: 4.1 G/DL (ref 3.5–5.2)
ALP SERPL-CCNC: 56 U/L (ref 40–150)
ALT SERPL W P-5'-P-CCNC: 16 U/L (ref 0–50)
ANION GAP SERPL CALCULATED.3IONS-SCNC: 12 MMOL/L (ref 7–15)
AST SERPL W P-5'-P-CCNC: 21 U/L (ref 0–45)
BILIRUB SERPL-MCNC: <0.2 MG/DL
BUN SERPL-MCNC: 12.7 MG/DL (ref 6–20)
CALCIUM SERPL-MCNC: 8.9 MG/DL (ref 8.6–10)
CHLORIDE SERPL-SCNC: 108 MMOL/L (ref 98–107)
CREAT SERPL-MCNC: 0.78 MG/DL (ref 0.51–0.95)
DEPRECATED HCO3 PLAS-SCNC: 19 MMOL/L (ref 22–29)
EGFRCR SERPLBLD CKD-EPI 2021: >90 ML/MIN/1.73M2
GLUCOSE SERPL-MCNC: 95 MG/DL (ref 70–99)
MAGNESIUM SERPL-MCNC: 1.8 MG/DL (ref 1.7–2.3)
POTASSIUM SERPL-SCNC: 3.9 MMOL/L (ref 3.4–5.3)
PROT SERPL-MCNC: 6.6 G/DL (ref 6.4–8.3)
SODIUM SERPL-SCNC: 139 MMOL/L (ref 135–145)
T4 FREE SERPL-MCNC: 1.16 NG/DL (ref 0.9–1.7)
TSH SERPL DL<=0.005 MIU/L-ACNC: 0.59 UIU/ML (ref 0.3–4.2)
VIT B12 SERPL-MCNC: 301 PG/ML (ref 232–1245)
VIT D+METAB SERPL-MCNC: 20 NG/ML (ref 20–50)

## 2024-12-22 ENCOUNTER — APPOINTMENT (OUTPATIENT)
Dept: RADIOLOGY | Facility: HOSPITAL | Age: 39
End: 2024-12-22
Attending: EMERGENCY MEDICINE
Payer: COMMERCIAL

## 2024-12-22 ENCOUNTER — HOSPITAL ENCOUNTER (EMERGENCY)
Facility: HOSPITAL | Age: 39
Discharge: HOME | End: 2024-12-22
Attending: EMERGENCY MEDICINE
Payer: COMMERCIAL

## 2024-12-22 VITALS
HEART RATE: 83 BPM | BODY MASS INDEX: 45.04 KG/M2 | OXYGEN SATURATION: 99 % | TEMPERATURE: 98.8 F | RESPIRATION RATE: 26 BRPM | DIASTOLIC BLOOD PRESSURE: 83 MMHG | HEIGHT: 67 IN | SYSTOLIC BLOOD PRESSURE: 135 MMHG | WEIGHT: 287 LBS

## 2024-12-22 DIAGNOSIS — M25.561 ACUTE PAIN OF RIGHT KNEE: ICD-10-CM

## 2024-12-22 DIAGNOSIS — R07.9 CHEST PAIN, UNSPECIFIED TYPE: ICD-10-CM

## 2024-12-22 LAB
ANION GAP SERPL CALCULATED.3IONS-SCNC: 9 MMOL/L (ref 7–15)
BUN SERPL-MCNC: 11.6 MG/DL (ref 6–20)
CALCIUM SERPL-MCNC: 9.5 MG/DL (ref 8.8–10.4)
CHLORIDE SERPL-SCNC: 106 MMOL/L (ref 98–107)
CREAT SERPL-MCNC: 0.83 MG/DL (ref 0.51–0.95)
D DIMER PPP FEU-MCNC: 0.36 UG/ML FEU (ref 0–0.5)
EGFRCR SERPLBLD CKD-EPI 2021: 90 ML/MIN/1.73M2
ERYTHROCYTE [DISTWIDTH] IN BLOOD BY AUTOMATED COUNT: 14.2 % (ref 10–15)
GLUCOSE SERPL-MCNC: 103 MG/DL (ref 70–99)
HCG UR QL: NEGATIVE
HCO3 SERPL-SCNC: 24 MMOL/L (ref 22–29)
HCT VFR BLD AUTO: 43.5 % (ref 35–47)
HGB BLD-MCNC: 14 G/DL (ref 11.7–15.7)
MCH RBC QN AUTO: 26.4 PG (ref 26.5–33)
MCHC RBC AUTO-ENTMCNC: 32.2 G/DL (ref 31.5–36.5)
MCV RBC AUTO: 82 FL (ref 78–100)
PLATELET # BLD AUTO: 288 10E3/UL (ref 150–450)
POTASSIUM SERPL-SCNC: 4.4 MMOL/L (ref 3.4–5.3)
RBC # BLD AUTO: 5.3 10E6/UL (ref 3.8–5.2)
SODIUM SERPL-SCNC: 139 MMOL/L (ref 135–145)
TROPONIN T SERPL HS-MCNC: 8 NG/L
WBC # BLD AUTO: 4.6 10E3/UL (ref 4–11)

## 2024-12-22 PROCEDURE — 93005 ELECTROCARDIOGRAM TRACING: CPT | Performed by: EMERGENCY MEDICINE

## 2024-12-22 PROCEDURE — 81025 URINE PREGNANCY TEST: CPT | Performed by: EMERGENCY MEDICINE

## 2024-12-22 PROCEDURE — 84484 ASSAY OF TROPONIN QUANT: CPT | Performed by: EMERGENCY MEDICINE

## 2024-12-22 PROCEDURE — 36415 COLL VENOUS BLD VENIPUNCTURE: CPT | Performed by: EMERGENCY MEDICINE

## 2024-12-22 PROCEDURE — 93005 ELECTROCARDIOGRAM TRACING: CPT | Performed by: STUDENT IN AN ORGANIZED HEALTH CARE EDUCATION/TRAINING PROGRAM

## 2024-12-22 PROCEDURE — 80048 BASIC METABOLIC PNL TOTAL CA: CPT | Performed by: EMERGENCY MEDICINE

## 2024-12-22 PROCEDURE — 85379 FIBRIN DEGRADATION QUANT: CPT | Performed by: EMERGENCY MEDICINE

## 2024-12-22 PROCEDURE — 73560 X-RAY EXAM OF KNEE 1 OR 2: CPT | Mod: RT

## 2024-12-22 PROCEDURE — 71046 X-RAY EXAM CHEST 2 VIEWS: CPT

## 2024-12-22 PROCEDURE — 99285 EMERGENCY DEPT VISIT HI MDM: CPT | Mod: 25

## 2024-12-22 PROCEDURE — 85014 HEMATOCRIT: CPT | Performed by: EMERGENCY MEDICINE

## 2024-12-22 ASSESSMENT — COLUMBIA-SUICIDE SEVERITY RATING SCALE - C-SSRS
1. IN THE PAST MONTH, HAVE YOU WISHED YOU WERE DEAD OR WISHED YOU COULD GO TO SLEEP AND NOT WAKE UP?: NO
6. HAVE YOU EVER DONE ANYTHING, STARTED TO DO ANYTHING, OR PREPARED TO DO ANYTHING TO END YOUR LIFE?: NO
2. HAVE YOU ACTUALLY HAD ANY THOUGHTS OF KILLING YOURSELF IN THE PAST MONTH?: NO

## 2024-12-22 ASSESSMENT — ACTIVITIES OF DAILY LIVING (ADL)
ADLS_ACUITY_SCORE: 41

## 2024-12-22 NOTE — ED TRIAGE NOTES
Patient arrives with chest pain, knee pain/swelling, sob, vision changes. Vision clear now. Right knee pain and swelling started about a week ago, no injury. Then pain started to move up into hip, no having dull pain in midsternal/right chest. Also reports SOB with pain.

## 2024-12-22 NOTE — DISCHARGE INSTRUCTIONS
Please follow-up with your Primary Care Provider on January 9, as previously scheduled - call to see if you can get an earlier appointment.    Please follow-up with the [facility] this week; call to arrange appointment.    Return to the ER for worsening symptoms, worsening chest pain, shortness of breath, if you pass out or feel that you might, nausea / vomiting, worsening knee pain, weakness / numbness / color changes to the right leg, if the knee becomes red / hot / swollen, fever or other concerns.    I recommend over-the-counter ibuprofen (600 mg po, every 6 hours, as needed) and lidocaine patches for knee pain.

## 2024-12-22 NOTE — ED PROVIDER NOTES
Emergency Department Encounter     Evaluation Date & Time:   2024 11:32 AM    CHIEF COMPLAINT:  Chest Pain and Knee Pain      Triage Note:Patient arrives with chest pain, knee pain/swelling, sob, vision changes. Vision clear now. Right knee pain and swelling started about a week ago, no injury. Then pain started to move up into hip, no having dull pain in midsternal/right chest. Also reports SOB with pain.       Impression and Plan       FINAL IMPRESSION:    ICD-10-CM    1. Chest pain, unspecified type  R07.9       2. Acute pain of right knee  M25.561             ED COURSE & MEDICAL DECISION MAKIN:42 AM I met with the patient for the initial interview and physical examination. Discussed plan for treatment and workup in the ED.       39 year old female, history of HTN, Crohn's disease, obesity and demyelinating disease, who presents for evaluation of atraumatic right knee pain and swelling that has been worsening since onset one week ago.   She has been able to walk, but reports increased pain when she completely flexes the knee. The pain feels tense and tight. No associated lower extremity weakness. No associated fevers. No inciting injury / trauma.    On exam, the right knee is slightly warm to touch with no appreciable erythema or effusion. She has good passive and active ROM, but reports pain with 90 degree flexion. CMS RLE intact.    X-rays right knee demonstrated no acute fracture or subluxation. Mild medial and patellofemoral compartment osteoarthritis. Small knee joint effusion. Elongation of the inferior patellar pole.     Consider inflammation secondary to arthritis. I do not suspect septic joint or gout. I also have low suspicion for Baker's cyst (pain mostly located to anterior aspect of knee).     Last night, the knee pain radiated to her right hip and then to her chest with ongoing dull, pinching substernal chest pain into today. She had one brief episode of shortness of breath, which  resolved and has not recurred.    On exam, she has RRR with clear and symmetrical breath sounds.    EKG performed and demonstrated NSR with no ischemic changes.  Troponin WNL (8); I do not think serial troponin testing is indicated.    PE and DVT considered.  D-dimer WNL (0.36).  I do not think CTA chest and / or venous ultrasound are indicated.    CXR performed and was negative.    Her chest pain is reproducible to palpation; consider musculoskeletal etiology, such as costochondritis.    Labs otherwise remarkable for no leukocytosis, anemia, electrolyte derangements or renal impairment.    Patient discharged to home with follow-up with her PCP and [facility].  Return precautions provided.  Patient stable throughout ED course.      At the conclusion of the encounter I discussed the results of all the tests and the disposition. The questions were answered. The patient acknowledged understanding and was agreeable with the care plan.    Medical Decision Making    History:  Obtained supplemental history:Supplemental history obtained?: No  Reviewed external records: External records reviewed?: No    External consultation:  Did you consider but not order tests?: Work up considered but not performed and documented in chart, if applicable  Did you interpret images independently?: Independent interpretation of ECG and images noted in documentation, when applicable.  Consultation discussion with other provider:Did you involve another provider (consultant, MH, pharmacy, etc.)?: No    Complicating factors:  Care impacted by chronic illness:Hypertension  Care significantly affected by social determinants of health:Other: Crohn's, obesity    Disposition considerations: Discharge. No recommendations on prescription strength medication(s). I considered admission, but ultimately discharged patient given reassuring evaluation.    MIPS    Not Applicable    MEDICATIONS GIVEN IN THE EMERGENCY DEPARTMENT:  Medications - No data  to display    NEW PRESCRIPTIONS STARTED AT TODAY'S ED VISIT:  Discharge Medication List as of 12/22/2024  1:58 PM          HPI     The history is provided by the patient. No  was used.        Lory Rodriguez is a 39 year old female, history of HTN, Crohn's disease, obesity and demyelinating disease, who presents to this ED via walk-in for evaluation of right knee pain and chest pain.    The patient reports right knee pain and swelling that has been worsening since onset one week ago. She has been able to walk, but reports increased pain with completely flexes the knee. The pain feels tense and tight. No associated lower extremity weakness. No associated fevers. No inciting injury / trauma.    The patient also reports that the knee pain radiated to her right hip and then to her chest starting last night. The chest pain is substernal in location. The pain feels dull and pinching in nature and is worse with her knee pain. The chest pain is not exertional, positional or pleuritic in nature. She had shortness of breath one time, which was brief and has not recurred.     She notes having brief blurry vision last night after she had been reading and then looked up from her book.     She denies having abdominal pain, nausea / vomiting, diarrhea, URI symptoms.     LNMP today.    Medical History     Past Medical History:   Diagnosis Date    Hypertension        acetaminophen (TYLENOL) 500 MG tablet  budesonide (ENTOCORT EC) 3 MG EC capsule  FERROUS FUMARATE 324 MG TABS tablet  ibuprofen (ADVIL/MOTRIN) 400 MG tablet  NIFEdipine ER OSMOTIC (PROCARDIA XL) 30 MG 24 hr tablet  VIORELE 0.15-0.02/0.01 MG (21/5) tablet        Physical Exam     First Vitals:  Patient Vitals for the past 24 hrs:   BP Temp Temp src Pulse Resp SpO2 Height Weight   12/22/24 1256 135/83 -- -- 83 26 99 % -- --   12/22/24 1201 (!) 150/110 -- -- 81 22 100 % -- --   12/22/24 1051 (!) 154/111 98.8  F (37.1  C) Oral 97 20 100 % 1.702 m (5'  "7\") 130.2 kg (287 lb)       PHYSICAL EXAM:   Physical Exam    GENERAL: Awake, alert.  In no acute distress.   HEENT: Normocephalic, atraumatic.  NECK: No stridor.  PULMONARY: Symmetrical breath sounds without distress.  Lungs clear to auscultation bilaterally without wheezes, rhonchi or rales.  CARDIO: Borderline tachycardic rate with regular rhythm.  No significant murmur, rub or gallop.  Radial pulses strong and symmetrical.  EXTREMITIES: Right knee is slightly warm to touch; no appreciable erythema or effusion. She has good passive and active ROM, but reports pain with 90 degree flexion. The RLE is warm and well perfused with strong pedal pulse. No lower extremity swelling or edema.      NEURO: Alert and oriented to person, place and time.  Cranial nerves grossly intact.  No focal motor deficit.  PSYCH: Normal mood and affect.      Results     LAB:  All pertinent labs reviewed and interpreted  Labs Ordered and Resulted from Time of ED Arrival to Time of ED Departure   CBC WITH PLATELETS - Abnormal       Result Value    WBC Count 4.6      RBC Count 5.30 (*)     Hemoglobin 14.0      Hematocrit 43.5      MCV 82      MCH 26.4 (*)     MCHC 32.2      RDW 14.2      Platelet Count 288     BASIC METABOLIC PANEL - Abnormal    Sodium 139      Potassium 4.4      Chloride 106      Carbon Dioxide (CO2) 24      Anion Gap 9      Urea Nitrogen 11.6      Creatinine 0.83      GFR Estimate 90      Calcium 9.5      Glucose 103 (*)    TROPONIN T, HIGH SENSITIVITY - Normal    Troponin T, High Sensitivity 8     D DIMER QUANTITATIVE - Normal    D-Dimer Quantitative 0.36     HCG QUALITATIVE URINE - Normal    hCG Urine Qualitative Negative         RADIOLOGY:  Chest XR,  PA & LAT   Final Result   IMPRESSION: Negative chest.         XR Knee Right 1/2 Views   Final Result   IMPRESSION: No acute fracture or subluxation. Mild medial and patellofemoral compartment osteoarthritis. Small knee joint effusion. Elongation of the inferior patellar " pole.          EC2024, 10:44; NSR with rate of 98 bpm; normal intervals; normal conduction; no ST-T wave changes consistent with ACS or pericarditis; no previous EKG available for comparison    EKG independently reviewed and interpreted by Arianne Kirkpatrick MD        I, David Cosby, am serving as a scribe to document services personally performed by Arianne Kirkpatrick MD based on my observation and the provider's statements to me. I, Arianne Kirkpatrick MD attest that David Cosby is acting in a scribe capacity, has observed my performance of the services and has documented them in accordance with my direction.    Arianne Kirkpatrick MD  Emergency Medicine  [facility] EMERGENCY DEPARTMENT           Arianne Kirkpatrick MD  [phone]

## 2024-12-23 LAB
ATRIAL RATE - MUSE: 98 BPM
DIASTOLIC BLOOD PRESSURE - MUSE: NORMAL MMHG
INTERPRETATION ECG - MUSE: NORMAL
P AXIS - MUSE: 58 DEGREES
PR INTERVAL - MUSE: 150 MS
QRS DURATION - MUSE: 84 MS
QT - MUSE: 348 MS
QTC - MUSE: 444 MS
R AXIS - MUSE: 27 DEGREES
SYSTOLIC BLOOD PRESSURE - MUSE: NORMAL MMHG
T AXIS - MUSE: 17 DEGREES
VENTRICULAR RATE- MUSE: 98 BPM

## 2025-02-18 ENCOUNTER — LAB REQUISITION (OUTPATIENT)
Dept: LAB | Facility: CLINIC | Age: 40
End: 2025-02-18

## 2025-02-18 DIAGNOSIS — Z13.6 ENCOUNTER FOR SCREENING FOR CARDIOVASCULAR DISORDERS: ICD-10-CM

## 2025-02-18 DIAGNOSIS — E55.9 VITAMIN D DEFICIENCY, UNSPECIFIED: ICD-10-CM

## 2025-02-18 DIAGNOSIS — I10 ESSENTIAL (PRIMARY) HYPERTENSION: ICD-10-CM

## 2025-02-18 DIAGNOSIS — E83.42 HYPOMAGNESEMIA: ICD-10-CM

## 2025-02-18 LAB
ALBUMIN SERPL BCG-MCNC: 4 G/DL (ref 3.5–5.2)
ALP SERPL-CCNC: 60 U/L (ref 40–150)
ALT SERPL W P-5'-P-CCNC: 16 U/L (ref 0–50)
ANION GAP SERPL CALCULATED.3IONS-SCNC: 17 MMOL/L (ref 7–15)
AST SERPL W P-5'-P-CCNC: 23 U/L (ref 0–45)
BILIRUB SERPL-MCNC: 0.2 MG/DL
BUN SERPL-MCNC: 10 MG/DL (ref 6–20)
CALCIUM SERPL-MCNC: 9.4 MG/DL (ref 8.8–10.4)
CHLORIDE SERPL-SCNC: 103 MMOL/L (ref 98–107)
CHOLEST SERPL-MCNC: 196 MG/DL
CREAT SERPL-MCNC: 0.8 MG/DL (ref 0.51–0.95)
EGFRCR SERPLBLD CKD-EPI 2021: >90 ML/MIN/1.73M2
FASTING STATUS PATIENT QL REPORTED: ABNORMAL
FASTING STATUS PATIENT QL REPORTED: ABNORMAL
GLUCOSE SERPL-MCNC: 107 MG/DL (ref 70–99)
HCO3 SERPL-SCNC: 18 MMOL/L (ref 22–29)
HDLC SERPL-MCNC: 73 MG/DL
LDLC SERPL CALC-MCNC: 100 MG/DL
MAGNESIUM SERPL-MCNC: 2.3 MG/DL (ref 1.7–2.3)
NONHDLC SERPL-MCNC: 123 MG/DL
POTASSIUM SERPL-SCNC: 4.1 MMOL/L (ref 3.4–5.3)
PROT SERPL-MCNC: 6.6 G/DL (ref 6.4–8.3)
SODIUM SERPL-SCNC: 138 MMOL/L (ref 135–145)
TRIGL SERPL-MCNC: 113 MG/DL

## 2025-02-18 PROCEDURE — 80053 COMPREHEN METABOLIC PANEL: CPT | Performed by: NURSE PRACTITIONER

## 2025-02-18 PROCEDURE — 83735 ASSAY OF MAGNESIUM: CPT | Performed by: NURSE PRACTITIONER

## 2025-02-18 PROCEDURE — 82652 VIT D 1 25-DIHYDROXY: CPT | Performed by: NURSE PRACTITIONER

## 2025-02-18 PROCEDURE — 80061 LIPID PANEL: CPT | Performed by: NURSE PRACTITIONER

## 2025-02-19 LAB — 1,25(OH)2D SERPL-MCNC: 89.3 PG/ML (ref 19.9–79.3)

## 2025-07-23 ENCOUNTER — LAB REQUISITION (OUTPATIENT)
Dept: LAB | Facility: CLINIC | Age: 40
End: 2025-07-23

## 2025-07-23 DIAGNOSIS — E55.9 VITAMIN D DEFICIENCY, UNSPECIFIED: ICD-10-CM

## 2025-07-23 DIAGNOSIS — Z86.2 PERSONAL HISTORY OF DISEASES OF THE BLOOD AND BLOOD-FORMING ORGANS AND CERTAIN DISORDERS INVOLVING THE IMMUNE MECHANISM: ICD-10-CM

## 2025-07-23 DIAGNOSIS — E53.8 DEFICIENCY OF OTHER SPECIFIED B GROUP VITAMINS: ICD-10-CM

## 2025-07-23 DIAGNOSIS — I10 ESSENTIAL (PRIMARY) HYPERTENSION: ICD-10-CM

## 2025-07-23 DIAGNOSIS — E83.42 HYPOMAGNESEMIA: ICD-10-CM

## 2025-07-23 LAB
BASOPHILS # BLD AUTO: 0.1 10E3/UL (ref 0–0.2)
BASOPHILS NFR BLD AUTO: 1 %
EOSINOPHIL # BLD AUTO: 0.1 10E3/UL (ref 0–0.7)
EOSINOPHIL NFR BLD AUTO: 2 %
ERYTHROCYTE [DISTWIDTH] IN BLOOD BY AUTOMATED COUNT: 15.8 % (ref 10–15)
HCT VFR BLD AUTO: 45 % (ref 35–47)
HGB BLD-MCNC: 14.2 G/DL (ref 11.7–15.7)
IMM GRANULOCYTES # BLD: 0 10E3/UL
IMM GRANULOCYTES NFR BLD: 0 %
LYMPHOCYTES # BLD AUTO: 1.3 10E3/UL (ref 0.8–5.3)
LYMPHOCYTES NFR BLD AUTO: 23 %
MCH RBC QN AUTO: 25.8 PG (ref 26.5–33)
MCHC RBC AUTO-ENTMCNC: 31.6 G/DL (ref 31.5–36.5)
MCV RBC AUTO: 82 FL (ref 78–100)
MONOCYTES # BLD AUTO: 0.5 10E3/UL (ref 0–1.3)
MONOCYTES NFR BLD AUTO: 9 %
NEUTROPHILS # BLD AUTO: 3.6 10E3/UL (ref 1.6–8.3)
NEUTROPHILS NFR BLD AUTO: 64 %
NRBC # BLD AUTO: 0 10E3/UL
NRBC BLD AUTO-RTO: 0 /100
PLATELET # BLD AUTO: 317 10E3/UL (ref 150–450)
RBC # BLD AUTO: 5.51 10E6/UL (ref 3.8–5.2)
WBC # BLD AUTO: 5.6 10E3/UL (ref 4–11)

## 2025-07-23 PROCEDURE — 84443 ASSAY THYROID STIM HORMONE: CPT | Performed by: NURSE PRACTITIONER

## 2025-07-23 PROCEDURE — 85004 AUTOMATED DIFF WBC COUNT: CPT | Performed by: NURSE PRACTITIONER

## 2025-07-23 PROCEDURE — 82306 VITAMIN D 25 HYDROXY: CPT | Performed by: NURSE PRACTITIONER

## 2025-07-23 PROCEDURE — 82607 VITAMIN B-12: CPT | Performed by: NURSE PRACTITIONER

## 2025-07-23 PROCEDURE — 80048 BASIC METABOLIC PNL TOTAL CA: CPT | Performed by: NURSE PRACTITIONER

## 2025-07-23 PROCEDURE — 83735 ASSAY OF MAGNESIUM: CPT | Performed by: NURSE PRACTITIONER

## 2025-07-24 LAB
ANION GAP SERPL CALCULATED.3IONS-SCNC: 10 MMOL/L (ref 7–15)
BUN SERPL-MCNC: 10.6 MG/DL (ref 6–20)
CALCIUM SERPL-MCNC: 9.6 MG/DL (ref 8.8–10.4)
CHLORIDE SERPL-SCNC: 106 MMOL/L (ref 98–107)
CREAT SERPL-MCNC: 0.91 MG/DL (ref 0.51–0.95)
EGFRCR SERPLBLD CKD-EPI 2021: 80 ML/MIN/1.73M2
GLUCOSE SERPL-MCNC: 94 MG/DL (ref 70–99)
HCO3 SERPL-SCNC: 22 MMOL/L (ref 22–29)
MAGNESIUM SERPL-MCNC: 2.1 MG/DL (ref 1.7–2.3)
POTASSIUM SERPL-SCNC: 4.6 MMOL/L (ref 3.4–5.3)
SODIUM SERPL-SCNC: 138 MMOL/L (ref 135–145)
TSH SERPL DL<=0.005 MIU/L-ACNC: 0.65 UIU/ML (ref 0.3–4.2)
VIT B12 SERPL-MCNC: 342 PG/ML (ref 232–1245)
VIT D+METAB SERPL-MCNC: 17 NG/ML (ref 20–50)